# Patient Record
Sex: FEMALE | Race: WHITE | Employment: UNEMPLOYED | URBAN - METROPOLITAN AREA
[De-identification: names, ages, dates, MRNs, and addresses within clinical notes are randomized per-mention and may not be internally consistent; named-entity substitution may affect disease eponyms.]

---

## 2021-03-31 ENCOUNTER — HOSPITAL ENCOUNTER (INPATIENT)
Age: 41
LOS: 5 days | Discharge: HOME | DRG: 753 | End: 2021-04-05
Attending: EMERGENCY MEDICINE | Admitting: PSYCHIATRY & NEUROLOGY
Payer: MEDICAID

## 2021-03-31 ENCOUNTER — HOSPITAL ENCOUNTER (EMERGENCY)
Age: 41
Discharge: HOME | DRG: 753 | End: 2021-03-31
Attending: EMERGENCY MEDICINE
Payer: MEDICAID

## 2021-03-31 VITALS
WEIGHT: 130 LBS | OXYGEN SATURATION: 99 % | BODY MASS INDEX: 22.2 KG/M2 | HEIGHT: 64 IN | DIASTOLIC BLOOD PRESSURE: 66 MMHG | SYSTOLIC BLOOD PRESSURE: 110 MMHG | RESPIRATION RATE: 16 BRPM | TEMPERATURE: 98 F | HEART RATE: 105 BPM

## 2021-03-31 DIAGNOSIS — F11.20 HEROIN ADDICTION (HCC): Primary | ICD-10-CM

## 2021-03-31 DIAGNOSIS — R45.851 SUICIDAL IDEATION: ICD-10-CM

## 2021-03-31 PROBLEM — F15.10 AMPHETAMINE ABUSE, CONTINUOUS (HCC): Status: ACTIVE | Noted: 2021-03-31

## 2021-03-31 PROBLEM — F31.9 BIPOLAR 1 DISORDER, DEPRESSED (HCC): Status: ACTIVE | Noted: 2021-03-31

## 2021-03-31 PROBLEM — F11.93 OPIOID WITHDRAWAL (HCC): Status: ACTIVE | Noted: 2021-03-31

## 2021-03-31 PROBLEM — F32.A DEPRESSION WITH SUICIDAL IDEATION: Status: ACTIVE | Noted: 2021-03-31

## 2021-03-31 LAB
-: ABNORMAL
ABSOLUTE EOS #: 0.3 K/UL (ref 0–0.4)
ABSOLUTE IMMATURE GRANULOCYTE: ABNORMAL K/UL (ref 0–0.3)
ABSOLUTE LYMPH #: 3.3 K/UL (ref 1–4.8)
ABSOLUTE MONO #: 0.7 K/UL (ref 0.1–1.3)
ACETAMINOPHEN LEVEL: <5 UG/ML (ref 10–30)
ALBUMIN SERPL-MCNC: 3.9 G/DL (ref 3.5–5.2)
ALBUMIN/GLOBULIN RATIO: ABNORMAL (ref 1–2.5)
ALP BLD-CCNC: 89 U/L (ref 35–104)
ALT SERPL-CCNC: 10 U/L (ref 5–33)
AMORPHOUS: ABNORMAL
AMPHETAMINE SCREEN URINE: POSITIVE
ANION GAP SERPL CALCULATED.3IONS-SCNC: 8 MMOL/L (ref 9–17)
AST SERPL-CCNC: 18 U/L
BACTERIA: ABNORMAL
BARBITURATE SCREEN URINE: NEGATIVE
BASOPHILS # BLD: 1 % (ref 0–2)
BASOPHILS ABSOLUTE: 0 K/UL (ref 0–0.2)
BENZODIAZEPINE SCREEN, URINE: NEGATIVE
BILIRUB SERPL-MCNC: 0.26 MG/DL (ref 0.3–1.2)
BILIRUBIN URINE: NEGATIVE
BUN BLDV-MCNC: 14 MG/DL (ref 6–20)
BUN/CREAT BLD: ABNORMAL (ref 9–20)
BUPRENORPHINE URINE: ABNORMAL
CALCIUM SERPL-MCNC: 8.9 MG/DL (ref 8.6–10.4)
CANNABINOID SCREEN URINE: NEGATIVE
CASTS UA: ABNORMAL /LPF
CHLORIDE BLD-SCNC: 97 MMOL/L (ref 98–107)
CO2: 27 MMOL/L (ref 20–31)
COCAINE METABOLITE, URINE: NEGATIVE
COLOR: ABNORMAL
COMMENT UA: ABNORMAL
CREAT SERPL-MCNC: 0.76 MG/DL (ref 0.5–0.9)
CRYSTALS, UA: ABNORMAL /HPF
DIFFERENTIAL TYPE: ABNORMAL
EOSINOPHILS RELATIVE PERCENT: 3 % (ref 0–4)
EPITHELIAL CELLS UA: ABNORMAL /HPF
ETHANOL PERCENT: <0.01 %
ETHANOL: <10 MG/DL
GFR AFRICAN AMERICAN: >60 ML/MIN
GFR NON-AFRICAN AMERICAN: >60 ML/MIN
GFR SERPL CREATININE-BSD FRML MDRD: ABNORMAL ML/MIN/{1.73_M2}
GFR SERPL CREATININE-BSD FRML MDRD: ABNORMAL ML/MIN/{1.73_M2}
GLUCOSE BLD-MCNC: 98 MG/DL (ref 70–99)
GLUCOSE URINE: NEGATIVE
HCG QUALITATIVE: NEGATIVE
HCT VFR BLD CALC: 37.4 % (ref 36–46)
HEMOGLOBIN: 12.4 G/DL (ref 12–16)
IMMATURE GRANULOCYTES: ABNORMAL %
KETONES, URINE: NEGATIVE
LEUKOCYTE ESTERASE, URINE: NEGATIVE
LYMPHOCYTES # BLD: 41 % (ref 24–44)
MCH RBC QN AUTO: 27.2 PG (ref 26–34)
MCHC RBC AUTO-ENTMCNC: 33.2 G/DL (ref 31–37)
MCV RBC AUTO: 81.9 FL (ref 80–100)
MDMA URINE: ABNORMAL
METHADONE SCREEN, URINE: NEGATIVE
METHAMPHETAMINE, URINE: ABNORMAL
MONOCYTES # BLD: 9 % (ref 1–7)
MUCUS: ABNORMAL
NITRITE, URINE: NEGATIVE
NRBC AUTOMATED: ABNORMAL PER 100 WBC
OPIATES, URINE: POSITIVE
OTHER OBSERVATIONS UA: ABNORMAL
OXYCODONE SCREEN URINE: NEGATIVE
PDW BLD-RTO: 14.7 % (ref 11.5–14.9)
PH UA: 6 (ref 5–8)
PHENCYCLIDINE, URINE: NEGATIVE
PLATELET # BLD: 503 K/UL (ref 150–450)
PLATELET ESTIMATE: ABNORMAL
PMV BLD AUTO: 7.7 FL (ref 6–12)
POTASSIUM SERPL-SCNC: 3.7 MMOL/L (ref 3.7–5.3)
PROPOXYPHENE, URINE: ABNORMAL
PROTEIN UA: ABNORMAL
RBC # BLD: 4.57 M/UL (ref 4–5.2)
RBC # BLD: ABNORMAL 10*6/UL
RBC UA: ABNORMAL /HPF
RENAL EPITHELIAL, UA: ABNORMAL /HPF
SALICYLATE LEVEL: <1 MG/DL (ref 3–10)
SARS-COV-2, RAPID: NOT DETECTED
SEG NEUTROPHILS: 46 % (ref 36–66)
SEGMENTED NEUTROPHILS ABSOLUTE COUNT: 3.8 K/UL (ref 1.3–9.1)
SODIUM BLD-SCNC: 132 MMOL/L (ref 135–144)
SPECIFIC GRAVITY UA: 1.03 (ref 1–1.03)
SPECIMEN DESCRIPTION: NORMAL
TEST INFORMATION: ABNORMAL
TOTAL PROTEIN: 8.1 G/DL (ref 6.4–8.3)
TRICHOMONAS: ABNORMAL
TRICYCLIC ANTIDEPRESSANTS, UR: ABNORMAL
TURBIDITY: CLEAR
URINE HGB: NEGATIVE
UROBILINOGEN, URINE: NORMAL
WBC # BLD: 8.1 K/UL (ref 3.5–11)
WBC # BLD: ABNORMAL 10*3/UL
WBC UA: ABNORMAL /HPF
YEAST: ABNORMAL

## 2021-03-31 PROCEDURE — 84703 CHORIONIC GONADOTROPIN ASSAY: CPT

## 2021-03-31 PROCEDURE — G0480 DRUG TEST DEF 1-7 CLASSES: HCPCS

## 2021-03-31 PROCEDURE — 1240000000 HC EMOTIONAL WELLNESS R&B

## 2021-03-31 PROCEDURE — 80179 DRUG ASSAY SALICYLATE: CPT

## 2021-03-31 PROCEDURE — 81001 URINALYSIS AUTO W/SCOPE: CPT

## 2021-03-31 PROCEDURE — 85025 COMPLETE CBC W/AUTO DIFF WBC: CPT

## 2021-03-31 PROCEDURE — 6370000000 HC RX 637 (ALT 250 FOR IP): Performed by: PSYCHIATRY & NEUROLOGY

## 2021-03-31 PROCEDURE — 80307 DRUG TEST PRSMV CHEM ANLYZR: CPT

## 2021-03-31 PROCEDURE — 87635 SARS-COV-2 COVID-19 AMP PRB: CPT

## 2021-03-31 PROCEDURE — 99284 EMERGENCY DEPT VISIT MOD MDM: CPT

## 2021-03-31 PROCEDURE — 36415 COLL VENOUS BLD VENIPUNCTURE: CPT

## 2021-03-31 PROCEDURE — 80053 COMPREHEN METABOLIC PANEL: CPT

## 2021-03-31 PROCEDURE — 6370000000 HC RX 637 (ALT 250 FOR IP): Performed by: NURSE PRACTITIONER

## 2021-03-31 PROCEDURE — 99223 1ST HOSP IP/OBS HIGH 75: CPT | Performed by: PSYCHIATRY & NEUROLOGY

## 2021-03-31 PROCEDURE — 80143 DRUG ASSAY ACETAMINOPHEN: CPT

## 2021-03-31 RX ORDER — CYCLOBENZAPRINE HCL 10 MG
10 TABLET ORAL 3 TIMES DAILY PRN
Status: DISCONTINUED | OUTPATIENT
Start: 2021-03-31 | End: 2021-04-05 | Stop reason: HOSPADM

## 2021-03-31 RX ORDER — LOPERAMIDE HYDROCHLORIDE 2 MG/1
2 CAPSULE ORAL 4 TIMES DAILY PRN
Status: DISCONTINUED | OUTPATIENT
Start: 2021-03-31 | End: 2021-04-05 | Stop reason: HOSPADM

## 2021-03-31 RX ORDER — POLYETHYLENE GLYCOL 3350 17 G/17G
17 POWDER, FOR SOLUTION ORAL DAILY PRN
Status: DISCONTINUED | OUTPATIENT
Start: 2021-03-31 | End: 2021-04-05 | Stop reason: HOSPADM

## 2021-03-31 RX ORDER — CLONIDINE HYDROCHLORIDE 0.1 MG/1
0.1 TABLET ORAL 3 TIMES DAILY
Status: DISCONTINUED | OUTPATIENT
Start: 2021-03-31 | End: 2021-04-04

## 2021-03-31 RX ORDER — ACETAMINOPHEN 325 MG/1
650 TABLET ORAL EVERY 4 HOURS PRN
Status: DISCONTINUED | OUTPATIENT
Start: 2021-03-31 | End: 2021-04-05 | Stop reason: HOSPADM

## 2021-03-31 RX ORDER — MAGNESIUM HYDROXIDE/ALUMINUM HYDROXICE/SIMETHICONE 120; 1200; 1200 MG/30ML; MG/30ML; MG/30ML
30 SUSPENSION ORAL EVERY 6 HOURS PRN
Status: DISCONTINUED | OUTPATIENT
Start: 2021-03-31 | End: 2021-04-05 | Stop reason: HOSPADM

## 2021-03-31 RX ORDER — TRAZODONE HYDROCHLORIDE 50 MG/1
50 TABLET ORAL NIGHTLY PRN
Status: DISCONTINUED | OUTPATIENT
Start: 2021-03-31 | End: 2021-04-05 | Stop reason: HOSPADM

## 2021-03-31 RX ORDER — ONDANSETRON 4 MG/1
4 TABLET, FILM COATED ORAL EVERY 8 HOURS PRN
Status: DISCONTINUED | OUTPATIENT
Start: 2021-03-31 | End: 2021-04-05 | Stop reason: HOSPADM

## 2021-03-31 RX ORDER — IBUPROFEN 400 MG/1
400 TABLET ORAL EVERY 6 HOURS PRN
Status: DISCONTINUED | OUTPATIENT
Start: 2021-03-31 | End: 2021-04-05 | Stop reason: HOSPADM

## 2021-03-31 RX ORDER — HYDROXYZINE 50 MG/1
50 TABLET, FILM COATED ORAL 3 TIMES DAILY PRN
Status: DISCONTINUED | OUTPATIENT
Start: 2021-03-31 | End: 2021-04-05 | Stop reason: HOSPADM

## 2021-03-31 RX ORDER — DICYCLOMINE HYDROCHLORIDE 10 MG/1
10 CAPSULE ORAL 3 TIMES DAILY PRN
Status: DISCONTINUED | OUTPATIENT
Start: 2021-03-31 | End: 2021-04-05 | Stop reason: HOSPADM

## 2021-03-31 RX ADMIN — CYCLOBENZAPRINE 10 MG: 10 TABLET, FILM COATED ORAL at 19:30

## 2021-03-31 RX ADMIN — IBUPROFEN 400 MG: 400 TABLET, FILM COATED ORAL at 19:30

## 2021-03-31 RX ADMIN — HYDROXYZINE HYDROCHLORIDE 50 MG: 50 TABLET, FILM COATED ORAL at 19:30

## 2021-03-31 ASSESSMENT — ENCOUNTER SYMPTOMS
ABDOMINAL PAIN: 0
DIARRHEA: 0
COUGH: 0
DIARRHEA: 0
SHORTNESS OF BREATH: 0
COUGH: 0
SHORTNESS OF BREATH: 0
BACK PAIN: 0
VOMITING: 0
ABDOMINAL PAIN: 0
BACK PAIN: 0
VOMITING: 0

## 2021-03-31 ASSESSMENT — SLEEP AND FATIGUE QUESTIONNAIRES
DO YOU USE A SLEEP AID: COMMENT
RESTFUL SLEEP: NO
DO YOU HAVE DIFFICULTY SLEEPING: COMMENT
DIFFICULTY FALLING ASLEEP: YES
SLEEP PATTERN: UTA
SLEEP PATTERN: DIFFICULTY FALLING ASLEEP;DISTURBED/INTERRUPTED SLEEP

## 2021-03-31 ASSESSMENT — LIFESTYLE VARIABLES: HISTORY_ALCOHOL_USE: YES

## 2021-03-31 NOTE — PROGRESS NOTES
Pharmacy Medication History Note      List of current medications patient is taking is complete. Source of information: Patient, OARRS    Changes made to medication list:  Medications removed (include reason, ex. therapy complete or physician discontinued, noncompliance): All medications    Medications added/doses adjusted:  none    Other notes (ex. Recent course of antibiotics, Coumadin dosing):  Patient reports not taking medications on admission  Checked OARRS and nothing was reported      Please let me know if you have any questions about this encounter. Thank you!     Electronically signed by Clara Moreno on 3/31/2021 at 8:28 AM
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
## 2021-03-31 NOTE — ED TRIAGE NOTES
Patient's personal belongings secured and patient changed into blue gown by [employer] Safeguard informed of 1:1 watch and that they must be able to view patient's face at all time and may not leave at any time, under any circumstances. Safeguard instructed to call on radio or yell for help if patient attempts to leave or harm self/others. Mode of arrival (squad #, walk in, police, etc) : family      Chief complaint(s): suicidal detox       Arrival Note (brief scenario, treatment PTA, etc). : return and voiced feeling of taking to much heroin         C= \"Have you ever felt that you should Cut down on your drinking? \"  No  A= \"Have people Annoyed you by criticizing your drinking? \"  No  G= \"Have you ever felt bad or Guilty about your drinking? \"  No  E= \"Have you ever had a drink as an Eye-opener first thing in the morning to steady your nerves or to help a hangover? \"  No      Deferred []      Reason for deferring: N/A    *If yes to two or more: probable alcohol abuse. *

## 2021-03-31 NOTE — CARE COORDINATION
BHI Biopsychosocial Assessment    Current Level of Psychosocial Functioning     Independent  X  Dependent    Minimal Assist     Comments:      Psychosocial High Risk Factors (check all that apply)    Unable to obtain meds   Chronic illness/pain    Substance abuse  X  Lack of Family Support   Financial stress   Isolation   Inadequate Community Resources  Suicide attempt(s) X  Not taking medications   Victim of crime   Developmental Delay  Unable to manage personal needs    Age 72 or older   Homeless  No transportation   Readmission within 30 days  Unemployment  Traumatic Event    Psychiatric Advanced Directive:    Family to involve in treatment: Pt reports having good support from family and can live with grandmother    Sexual Orientation:  Heterosexual    Patient Strengths: Family support    Patient Barriers: Poor judgement and coping skills, substance abuse    Opiate education provided: Not at this time. Safety plan: Not completed at this time due to pt detoxing    CMHC/MH history: [facility]SI    Plan of Care:  medication management, group/individual therapies, family meetings, psycho -education, treatment team meetings to assist with stabilization    Initial Discharge Plan:  Can live with grandmother. Clinical Summary:  Pt is a 45year old  female admitted to the [facility] for safety and stabilization. Pt agreed to meet with writer outside of room. Pt answered questions during first part of assessment denying any SI, HI or hallucinations stating \"I just need to detox. \" Pt appeared to fall a sleep, head tucked into chest with arm supporting head going lax. Pt continued to answer questions to the best of her ability after writer would call pt's name.

## 2021-03-31 NOTE — PROGRESS NOTES
Behavioral Services  Medicare Certification Upon Admission    I certify that this patient's inpatient psychiatric hospital admission is medically necessary for:    [x] (1) Treatment which could reasonably be expected to improve this patient's condition,       [x] (2) Or for diagnostic study;     AND     [x](2) The inpatient psychiatric services are provided while the individual is under the care of a physician and are included in the individualized plan of care.     Estimated length of stay/service 4 to 7 days    Plan for post-hospital care Home with outpatient community health follow-up versus inpatient rehab    Electronically signed by Silvino Fregoso MD on 3/31/2021 at 4:10 PM

## 2021-03-31 NOTE — Clinical Note
Patient Class: Inpatient [101]   REQUIRED: Diagnosis: Depression with suicidal ideation [614968]   Estimated Length of Stay: Estimated stay of more than 2 midnights   Admitting Provider: Josi Roman [[id number]]   Preferred Department: [facility]

## 2021-03-31 NOTE — GROUP NOTE
Group Therapy Note    Date: 3/31/2021    Group Start Time: 1320  Group End Time: 3111  Group Topic: Cognitive Skills    SLOAN Burch        Group Therapy Note    Attendees: 3/13      patient refused to attend creative expression group at [phone] after encouragement from staff. 1:1 talk time provided as alternative to group session.        Signature:  Arcelia Mccabe, [address]

## 2021-03-31 NOTE — BH NOTE
Physician Communication    [phone]: On call internal medicine physician notified of consult via [employer]. [phone]: Physician acknowledged consult and will see patient on 4/1/2021 per message on [employer].
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
## 2021-03-31 NOTE — BH NOTE
Patient given tobacco quitline number [phone] at this time, refusing to call at this time, states \" I just dont want to quit now\"- patient given information as to the dangers of long term tobacco use. Continue to reinforce the importance of tobacco cessation.

## 2021-03-31 NOTE — ED PROVIDER NOTES
EMERGENCY DEPARTMENT ENCOUNTER    Pt Name: Laya Greene  MRN: [id number]  [place] 1980  Date of evaluation: 3/31/21  CHIEF COMPLAINT       Chief Complaint   Patient presents with    Mental Health Problem     HISTORY OF PRESENT ILLNESS   HPI. This is a 43-year-old female with a history of opioid dependence who comes in. I just evaluated this patient she was denying suicidal and homicidal ideation. She was discharged. She presents complaining of suicidal ideation with an intent to overdose on heroin. She has no new complaints at this time. REVIEW OF SYSTEMS     Review of Systems   Constitutional: Negative for fever. HENT: Negative for congestion. Respiratory: Negative for cough and shortness of breath. Cardiovascular: Negative for chest pain. Gastrointestinal: Negative for abdominal pain, diarrhea and vomiting. Genitourinary: Negative for dysuria. Musculoskeletal: Negative for back pain. Skin: Negative for rash. Neurological: Negative for headaches. Psychiatric/Behavioral: Positive for suicidal ideas. All other systems reviewed and are negative. PASTMEDICAL HISTORY     Past Medical History:   Diagnosis Date    B[facility] disorder ([facility] [address]     Depression      SURGICAL HISTORY       Past Surgical History:   Procedure Laterality Date    TUBAL LIGATION       CURRENT MEDICATIONS       Previous Medications    BUPRENORPHINE HCL-NALOXONE HCL (SUBOXONE) 4-1 MG FILM    Place 4 mg under the tongue 4 times daily. BUSPIRONE (BUSPAR) 10 MG TABLET    Take 20 mg by mouth 2 times daily    CITALOPRAM (CELEXA) 20 MG TABLET    Take 1 tablet by mouth daily for 30 days. FLUOXETINE (PROZAC) 20 MG CAPSULE    Take 20 mg by mouth three times daily    IBUPROFEN (ADVIL;MOTRIN) 800 MG TABLET    Take 1 tablet by mouth every 8 hours as needed for Pain    LITHIUM (ESKALITH) 450 MG CR TABLET    Take 1 tablet by mouth 2 times daily for 30 days.     OLANZAPINE (ZYPREXA) 10 MG TABLET    Take 10 mg by mouth 2 by the radiologist, see reports below, unless otherwisenoted in MDM or here. No orders to display     LABS: All lab results were reviewed by myself, and all abnormals are listed below. Labs Reviewed   COVID-19, RAPID   CBC WITH AUTO DIFFERENTIAL   COMPREHENSIVE METABOLIC PANEL   HCG, SERUM, QUALITATIVE   ETHANOL   ACETAMINOPHEN LEVEL   SALICYLATE LEVEL       EMERGENCY DEPARTMENTCOURSE:   Differential diagnosis includes exacerbation of chronic mental illness malingering polysubstance abuse. Patient has no medical complaints at this time she is medically clear patient now suicidal will admit to the [facility] for further management of her mental health disorder and substance abuse. Vitals:    Vitals:    03/31/21 0408   Resp: 16   Temp: 98 °F (36.7 °C)   TempSrc: Oral   Weight: 130 lb (59 kg)   Height: 5' 4\" (1.626 m)         FINAL IMPRESSION      1. Heroin addiction ([facility] [address]    2.  Suicidal ideation         DISPOSITION/PLAN   DISPOSITION Decision To Admit 03/31/2021 04:41:32 AM    Lorene High MD  Attending Emergency Physician    This charting supersedes any ED resident or staff charting and was written using speech recognition software        Lorene High MD  03/31/21 1800 Johnnie Hyman MD  03/31/21 4929

## 2021-03-31 NOTE — BH NOTE
`[facility] [place]  Admission Note     Admission Type:   Admission Type: Voluntary    Reason for admission:  Reason for Admission: Increased depression related to addiction and an increase in life stressors.     PATIENT STRENGTHS:  Strengths: Positive Support, Social Skills    Patient Strengths and Limitations:  Limitations: General negative or hopeless attitude about future/recovery, External locus of control    Addictive Behavior:   Addictive Behavior  In the past 3 months, have you felt or has someone told you that you have a problem with:  : None  Do you have a history of Chemical Use?: Yes  Do you have a history of Alcohol Use?: Yes  Do you have a history of Street Drug Abuse?: Yes  Histroy of Prescripton Drug Abuse?: No    Medical Problems:   Past Medical History:   Diagnosis Date    Bipolar disorder ([facility] [address]     Depression        Status EXAM:  Status and Exam  Normal: No  Facial Expression: Flat  Affect: Appropriate  Level of Consciousness: Alert  Mood:Normal: No  Mood: Anxious, Depressed  Motor Activity:Normal: No  Motor Activity: Decreased  Interview Behavior: Cooperative  Preception: Tracy to Person, Jillene Pry to Time, Tracy to Place, Tracy to Situation  Attention:Normal: Yes  Thought Content:Normal: No  Thought Content: Poverty of Content  Hallucinations: None  Delusions: No  Memory:Normal: Yes  Insight and Judgment: No  Insight and Judgment: Poor Judgment, Poor Insight  Present Suicidal Ideation: No  Present Homicidal Ideation: No    Tobacco Screening:  Practical Counseling, on admission, carlos X, if applicable and completed (first 3 are required if patient doesn't refuse):            ( )  Recognizing danger situations (included triggers and roadblocks)                    ( )  Coping skills (new ways to manage stress, exercise, relaxation techniques, changing routine, distraction)                                                           ( )  Basic information about quitting (benefits of quitting, techniques in how to quit, available resources  ( ) Referral for counseling faxed to [facility]                                           (x ) Patient refused counseling  ( ) Patient has not smoked in the last 30 days    Metabolic Screening:    No results found for: LABA1C    No results found for: CHOL  No results found for: TRIG  No results found for: HDL  No components found for: LDLCAL  No results found for: LABVLDL      Body mass index is 22.31 kg/m². BP Readings from Last 2 Encounters:   03/31/21 (!) 102/57   03/31/21 110/66           Pt admitted with followings belongings:  Dentures: None  Vision - Corrective Lenses: None  Hearing Aid: None  Jewelry: Ring, Necklace, Bracelet  Body Piercings Removed: N/A  Clothing: Footwear, Pants, Jacket / coat, Shirt  Were All Patient Medications Collected?: Not Applicable  Other Valuables: None     Valuables sent home with patient. Valuables placed in safe in security envelope, number:  [id number]. Patient's home medications were verified. Patient oriented to surroundings and program expectations and copy of patient rights given. Received admission packet:  yes. Consents reviewed, signed yes. Refused no. Patient verbalize understanding:  yes. Patient education on precautions: completed                 Patient brought to the unit by [facility] staff from the ED. Patient recently came [facility] from [facility]. Patient admitted to suicidal ideation in the ED, but denied on admission. Patient reported recent heroin use with last use on 3/30/2021. Patient was withdrawn during interview and gave minimal answers. Patient verbalized depression and anxiety. Patient was wanded and oriented to the unit.   Samuel Dobson RN

## 2021-03-31 NOTE — PLAN OF CARE
5 [facility]  Initial Interdisciplinary Treatment Plan NO      Original treatment plan Date & Time:3/31/21    Admission Type:       Reason for admission:        Estimated Length of Stay:  5-7days  Estimated Discharge Date: to be determined by physician    PATIENT STRENGTHS:  Patient Strengths:Strengths: Positive Support, Social Skills  Patient Strengths and Limitations:Limitations: General negative or hopeless attitude about future/recovery, External locus of control  Addictive Behavior: Addictive Behavior  In the past 3 months, have you felt or has someone told you that you have a problem with:  : None  Do you have a history of Chemical Use?: Yes  Do you have a history of Alcohol Use?: Yes  Do you have a history of Street Drug Abuse?: Yes  Histroy of Prescripton Drug Abuse?: No  Medical Problems:  Past Medical History:   Diagnosis Date    Bipolar disorder ([facility] [place] [facility]     Depression      Status EXAM:Status and Exam  Normal: No  Facial Expression: Flat  Affect: Appropriate  Level of Consciousness: Alert  Mood:Normal: No  Mood: Anxious, Depressed  Motor Activity:Normal: No  Motor Activity: Decreased  Interview Behavior: Cooperative  Preception: [place] to Person, Ronald Katayama to Time, [place] to Place, [place] to Situation  Attention:Normal: Yes  Thought Content:Normal: No  Thought Content: Poverty of Content  Hallucinations: None  Delusions: No  Memory:Normal: Yes  Insight and Judgment: No  Insight and Judgment: Poor Judgment, Poor Insight  Present Suicidal Ideation: No  Present Homicidal Ideation: No    EDUCATION:   Learner Progress Toward Treatment Goals: reviewed group plans and strategies for care    Method:group therapy, medication compliance, individualized assessments and care planning    Outcome: needs reinforcement    PATIENT GOALS: to be discussed with patient within 72 hours    PLAN/TREATMENT RECOMMENDATIONS:     continue group therapy , medications compliance, goal setting, individualized assessments and care, continue to monitor pt on unit      SHORT-TERM GOALS:   Time frame for Short-Term Goals: 5-7 days    LONG-TERM GOALS:  Time frame for Long-Term Goals: 6 months  Members Present in Team Meeting: See Signature [address] [place]

## 2021-03-31 NOTE — GROUP NOTE
Group Therapy Note    Date: 3/31/2021    Group Start Time: 0900  Group End Time: 0920  Group Topic: Community Meeting    [facility]    Patient refused to attend community meeting and goal setting group at 0900 after encouragement from staff. 1:1 talk time offered by staff as alternative to group session.

## 2021-03-31 NOTE — ED NOTES
Brad Tejeda is a 45year old female who presents to the ED via pt's family for care of detox. Pt denies SI/HI/AH. Pt is looking for heroin detox services. Pt intravenously abuses a 1/2 gram of heroin on daily. Pt's last use was around noon yesterday. Pt admits to taking two Suboxone that pt got from a friend before coming to the ED. Pt also reports pt occasionally smokes meth and last did 2 days ago. Pt just moved back to [place] from [address] [place] [address] 2 days ago in order to try and regain custody of her 3 children. Pt's children are the ages of 13, 15, and 6. Pt signed custody of her children over to their father 6 years ago and their father has recently passed away. Pt is wanting detox services in order to better herself for her children. Pt's family is being supportive of pt and is helping pt work towards her sobriety. Pt reports pt has been previously diagnosed with bipolar disorder, anxiety and borderline personality disorder. Pt has been off medications for her mental health for the past 6 years. Pt has no previous admission to the [facility]. Pt continues to deny SI/HI/AH. RASHAWN discussed inpatient/outpatient detox services in the [facility] with pt. Pt reports pt feels safe to be discharged and plans to follow up with [facility] ambulatory detox in the morning. RASHAWN provided pt with a book of substance abuse resources and [facility] information. RASHAWN encouraged pt to follow up with [facility] for care of pt's mental health as well as a detox facility. RASHAWN consulted with ED Dr. ED Dr and RASHAWN agree pt is safe to be discharged home. Pt is not a risk to self or others at this time. Pt denies SI/HI. Pt agreeable to be discharged home and follow up outpatient services.

## 2021-04-01 PROBLEM — R82.90 ABNORMAL URINALYSIS: Status: ACTIVE | Noted: 2021-04-01

## 2021-04-01 PROBLEM — E87.1 HYPONATREMIA: Status: ACTIVE | Noted: 2021-04-01

## 2021-04-01 PROBLEM — E87.6 HYPOKALEMIA: Status: ACTIVE | Noted: 2021-04-01

## 2021-04-01 LAB
ANION GAP SERPL CALCULATED.3IONS-SCNC: 11 MMOL/L (ref 9–17)
BUN BLDV-MCNC: 19 MG/DL (ref 6–20)
BUN/CREAT BLD: NORMAL (ref 9–20)
CALCIUM SERPL-MCNC: 9 MG/DL (ref 8.6–10.4)
CHLORIDE BLD-SCNC: 101 MMOL/L (ref 98–107)
CO2: 24 MMOL/L (ref 20–31)
CREAT SERPL-MCNC: 0.67 MG/DL (ref 0.5–0.9)
GFR AFRICAN AMERICAN: >60 ML/MIN
GFR NON-AFRICAN AMERICAN: >60 ML/MIN
GFR SERPL CREATININE-BSD FRML MDRD: NORMAL ML/MIN/{1.73_M2}
GFR SERPL CREATININE-BSD FRML MDRD: NORMAL ML/MIN/{1.73_M2}
GLUCOSE BLD-MCNC: 93 MG/DL (ref 70–99)
HIV AG/AB: NONREACTIVE
POTASSIUM SERPL-SCNC: 3.8 MMOL/L (ref 3.7–5.3)
SODIUM BLD-SCNC: 136 MMOL/L (ref 135–144)
T. PALLIDUM, IGG: NONREACTIVE
TSH SERPL DL<=0.05 MIU/L-ACNC: 0.32 MIU/L (ref 0.3–5)

## 2021-04-01 PROCEDURE — 6370000000 HC RX 637 (ALT 250 FOR IP): Performed by: NURSE PRACTITIONER

## 2021-04-01 PROCEDURE — 87389 HIV-1 AG W/HIV-1&-2 AB AG IA: CPT

## 2021-04-01 PROCEDURE — 1240000000 HC EMOTIONAL WELLNESS R&B

## 2021-04-01 PROCEDURE — 6370000000 HC RX 637 (ALT 250 FOR IP): Performed by: PSYCHIATRY & NEUROLOGY

## 2021-04-01 PROCEDURE — 36415 COLL VENOUS BLD VENIPUNCTURE: CPT

## 2021-04-01 PROCEDURE — 84443 ASSAY THYROID STIM HORMONE: CPT

## 2021-04-01 PROCEDURE — 99232 SBSQ HOSP IP/OBS MODERATE 35: CPT | Performed by: PSYCHIATRY & NEUROLOGY

## 2021-04-01 PROCEDURE — 99253 IP/OBS CNSLTJ NEW/EST LOW 45: CPT | Performed by: INTERNAL MEDICINE

## 2021-04-01 PROCEDURE — 80048 BASIC METABOLIC PNL TOTAL CA: CPT

## 2021-04-01 PROCEDURE — 86780 TREPONEMA PALLIDUM: CPT

## 2021-04-01 RX ADMIN — IBUPROFEN 400 MG: 400 TABLET, FILM COATED ORAL at 06:52

## 2021-04-01 RX ADMIN — ACETAMINOPHEN 650 MG: 325 TABLET, FILM COATED ORAL at 21:06

## 2021-04-01 RX ADMIN — CYCLOBENZAPRINE 10 MG: 10 TABLET, FILM COATED ORAL at 06:52

## 2021-04-01 RX ADMIN — IBUPROFEN 400 MG: 400 TABLET, FILM COATED ORAL at 15:41

## 2021-04-01 RX ADMIN — HYDROXYZINE HYDROCHLORIDE 50 MG: 50 TABLET, FILM COATED ORAL at 15:40

## 2021-04-01 RX ADMIN — TRAZODONE HYDROCHLORIDE 50 MG: 50 TABLET ORAL at 21:07

## 2021-04-01 RX ADMIN — DICYCLOMINE HYDROCHLORIDE 10 MG: 10 CAPSULE ORAL at 10:06

## 2021-04-01 RX ADMIN — HYDROXYZINE HYDROCHLORIDE 50 MG: 50 TABLET, FILM COATED ORAL at 06:52

## 2021-04-01 RX ADMIN — ACETAMINOPHEN 650 MG: 325 TABLET, FILM COATED ORAL at 10:05

## 2021-04-01 RX ADMIN — CYCLOBENZAPRINE 10 MG: 10 TABLET, FILM COATED ORAL at 15:40

## 2021-04-01 ASSESSMENT — PAIN SCALES - GENERAL
PAINLEVEL_OUTOF10: 2
PAINLEVEL_OUTOF10: 1
PAINLEVEL_OUTOF10: 7
PAINLEVEL_OUTOF10: 2
PAINLEVEL_OUTOF10: 2

## 2021-04-01 NOTE — GROUP NOTE
Group Therapy Note    Date: 4/1/2021    Group Start Time: 1430  Group End Time: 1500  Group Topic: Healthy Living/Wellness    [facility]    Patient refused to attend health and fitness group at 1430 after encouragement from staff. 1:1 talk time offered by staff as alternative to group session.

## 2021-04-01 NOTE — PLAN OF CARE
5 [facility]  Day 3 Interdisciplinary Treatment Plan NOTE    Review Date & Time: 4/1/2021 1258    Admission Type:   Admission Type: Voluntary    Reason for admission:  Reason for Admission: Increased depression related to addiction and an increase in life stressors.   Estimated Length of Stay:  5-7 days  Estimated Discharge Date Update:  to be determined by physician    PATIENT STRENGTHS:  Patient Strengths:Strengths: Positive Support, No significant Physical Illness  Patient Strengths and Limitations:Limitations: Tendency to isolate self, External locus of control, Inappropriate/potentially harmful leisure interests, Lacks leisure interests, Multiple barriers to leisure interests  Addictive Behavior:Addictive Behavior  In the past 3 months, have you felt or has someone told you that you have a problem with:  : None  Do you have a history of Chemical Use?: No  Do you have a history of Alcohol Use?: No  Do you have a history of Street Drug Abuse?: Yes  Histroy of Prescripton Drug Abuse?: No  Medical Problems:  Past Medical History:   Diagnosis Date    Bipolar disorder ([facility]     Depression        Risk:  Fall RiskTotal: 71  Ricki Scale Ricki Scale Score: 23  BVC Total: 0  Change in scores:  No Changes to plan of Care:  No    Status EXAM:   Status and Exam  Normal: No  Facial Expression: Avoids Gaze, Flat  Affect: Incongruent  Level of Consciousness: Alert  Mood:Normal: No  Mood: Depressed, Sad  Motor Activity:Normal: No  Motor Activity: Decreased  Interview Behavior: Cooperative  Preception: [place] to Person, [place] to Time, [place] to Place, [place] to Situation  Attention:Normal: No  Attention: Unable to Concentrate  Thought Processes: Blocking  Thought Content:Normal: No  Thought Content: Poverty of Content  Hallucinations: None  Delusions: No  Memory:Normal: Yes  Insight and Judgment: Yes  Insight and Judgment: Poor Insight, Poor Judgment  Present Suicidal Ideation: No  Present Homicidal Ideation: No    Daily Assessment Last Entry:             Patient Currently in Pain: Denies       Patient Monitoring:  Frequency of Checks: 4 times per hour, close    Psychiatric Symptoms:                    Suicide Risk CSSR-S:  1) Within the past month, have you wished you were dead or wished you could go to sleep and not wake up? : No  2) Have you actually had any thoughts of killing yourself? : No  3) Have you been thinking about how you might kill yourself? : No  5) Have you started to work out or worked out the details of how to kill yourself?  Do you intend to carry out this plan? : No  6) Have you ever done anything, started to do anything, or prepared to do anything to end your life?: No  Change in Result:  Change in Plan of care:       EDUCATION:   Learner Progress Toward Treatment Goals: Reviewed results and recommendations of this team, Reviewed group plan and strategies, Reviewed signs, symptoms and risk of self harm and violent behavior, Reviewed goals and plan of care    Method:  small group, individual verbal education    Outcome:  Verbalized by patient but needs reinforcement to obtain goals    PATIENT GOALS:  Short term:  Pt unable to participate  Long term:  Pt unable to participated    PLAN/TREATMENT RECOMMENDATIONS UPDATE:  continue with group therapies, increased socialization, continue planning for after discharge goals, continue with medication compliance    SHORT-TERM GOALS UPDATE:   Time frame for Short-Term Goals:  5-7 days    LONG-TERM GOALS UPDATE:   Time frame for Long-Term Goals:  6 months    Members Present in Team Meeting:   See signature sheet  Jeanne Gina, CTRS

## 2021-04-01 NOTE — CONSULTS
negative for fevers, chills, sweats, fatigue, weight loss  HEENT:  negative for vision, hearing changes, runny nose, throat pain  RESPIRATORY:  negative for shortness of breath, cough, congestion, wheezing. CARDIOVASCULAR:  negative for chest pain, palpitations. GASTROINTESTINAL: Abdominal cramps, loose stools  GENITOURINARY:  negative for difficulty of urination, burning with urination, frequency   INTEGUMENT:  negative for rash, skin lesions, easy bruising   HEMATOLOGIC/LYMPHATIC:  negative for swelling/edema   ALLERGIC/IMMUNOLOGIC:  negative for urticaria , itching  ENDOCRINE:  negative increase in drinking, increase in urination, hot or cold intolerance  MUSCULOSKELETAL:  negative joint pains, muscle aches, swelling of joints  NEUROLOGICAL:  negative for headaches, dizziness, lightheadedness, numbness, pain, tingling extremities  BEHAVIOR/PSYCH:      Physical Exam:     BP (!) 93/50   Pulse 92   Temp 98 °F (36.7 °C) (Oral)   Resp 14   Ht 5' 4\" (1.626 m)   Wt 130 lb (59 kg)   LMP 2010 (Within Weeks)   SpO2 100%   BMI 22.31 kg/m²   Temp (24hrs), Av °F (36.7 °C), Min:98 °F (36.7 °C), Max:98 °F (36.7 °C)    No results for input(s): POCGLU in the last 72 hours. No intake or output data in the 24 hours ending 21 1608    General Appearance:  alert, well appearing, and in no acute distress  Mental status: oriented to person, place, and time with normal affect  Head:  normocephalic, atraumatic. Eye: no icterus, redness, pupils equal and reactive, extraocular eye movements intact, conjunctiva clear  Ear: normal external ear, no discharge, hearing intact  Nose:  no drainage noted  Mouth: mucous membranes moist  Neck: supple, no carotid bruits, thyroid not palpable  Lungs: Bilateral equal air entry, clear to ausculation, no wheezing, rales or rhonchi, normal effort  Cardiovascular: normal rate, regular rhythm, no murmur, gallop, rub.   Abdomen: Soft, nontender, nondistended, normal bowel sounds, no Urinalysis Comments NOT REPORTED    Microscopic Urinalysis    Collection Time: 03/31/21  7:38 PM   Result Value Ref Range    -          WBC, UA 2 TO 5 /HPF    RBC, UA 2 TO 5 /HPF    Casts UA NOT REPORTED /LPF    Crystals, UA NOT REPORTED None /HPF    Epithelial Cells UA 5 TO 10 /HPF    Renal Epithelial, UA NOT REPORTED 0 /HPF    Bacteria, UA MANY (A) None    Mucus, UA 3+ (A) None    Trichomonas, UA NOT REPORTED None    Amorphous, UA 1+ (A) None    Other Observations UA NOT REPORTED NOT REQ. Yeast, UA NOT REPORTED None   TSH w/reflex to FT4    Collection Time: 04/01/21  7:15 AM   Result Value Ref Range    TSH 0.32 0.30 - 5.00 mIU/L   HIV Screen    Collection Time: 04/01/21  7:15 AM   Result Value Ref Range    HIV Ag/Ab NONREACTIVE NONREACTIVE   T.pallidum Ab Screen    Collection Time: 04/01/21  7:15 AM   Result Value Ref Range    T. pallidum, IgG NONREACTIVE NONREACTIVE   BASIC METABOLIC PANEL    Collection Time: 04/01/21  2:06 PM   Result Value Ref Range    Glucose 93 70 - 99 mg/dL    BUN 19 6 - 20 mg/dL    CREATININE 0.67 0.50 - 0.90 mg/dL    Bun/Cre Ratio NOT REPORTED 9 - 20    Calcium 9.0 8.6 - 10.4 mg/dL    Sodium 136 135 - 144 mmol/L    Potassium 3.8 3.7 - 5.3 mmol/L    Chloride 101 98 - 107 mmol/L    CO2 24 20 - 31 mmol/L    Anion Gap 11 9 - 17 mmol/L    GFR Non-African American >60 >60 mL/min    GFR African American >60 >60 mL/min    GFR Comment          GFR Staging NOT REPORTED            Consultations:   IP CONSULT TO INTERNAL MEDICINE  Assessment :      Primary Problem  Bipolar 1 disorder, depressed ([facility]    Active Hospital Problems    Diagnosis Date Noted    Depression with suicidal ideation [F32.9, R45.851] 03/31/2021    Bipolar 1 disorder, depressed ([facility] [F31.9] 03/31/2021    Opioid withdrawal (HCC) [F11.23] 03/31/2021    Amphetamine abuse, continuous ([facility] [F15.10] 03/31/2021       Plan:     1.  Hyponatremia, hypokalemia, possibility of dehydration, repeat testing, potassium and sodium is in normal range  2. Abnormal UA, denying complaint of dysuria, nitrites, leukocyte esterase is negative, 5-10 epithelial cells in UA possible contaminated sample  3. Slightly elevated platelets, no further work-up is required  4. Jae Nicholson MD  4/1/2021  4:08 PM    Copy sent to Dr. Vasquez primary care provider on file. Please note that this chart was generated using voice recognition Dragon dictation software. Although every effort was made to ensure the accuracy of this automated transcription, some errors in transcription may have occurred.

## 2021-04-01 NOTE — PROGRESS NOTES
continued suicidal ideation, able to contract for safety on unit  Delusions:  no evidence of delusions  Perceptual Disturbance:  denies any perceptual disturbance  Cognition:  Oriented to self, location, time, and situation  Memory: age appropriate  Insight & Judgement: improving  Medication side effects:  denies       Data   height is 5' 4\" (1.626 m) and weight is 130 lb (59 kg). Her oral temperature is 98 °F (36.7 °C). Her blood pressure is 96/50 (abnormal) and her pulse is 92. Her respiration is 14 and oxygen saturation is 100%.    Labs:   Admission on 03/31/2021   Component Date Value Ref Range Status    WBC 03/31/2021 8.1  3.5 - 11.0 k/uL Final    RBC 03/31/2021 4.57  4.0 - 5.2 m/uL Final    Hemoglobin 03/31/2021 12.4  12.0 - 16.0 g/dL Final    Hematocrit 03/31/2021 37.4  36 - 46 % Final    MCV 03/31/2021 81.9  80 - 100 fL Final    MCH 03/31/2021 27.2  26 - 34 pg Final    MCHC 03/31/2021 33.2  31 - 37 g/dL Final    RDW 03/31/2021 14.7  11.5 - 14.9 % Final    Platelets 88/93/1193 503* 150 - 450 k/uL Final    MPV 03/31/2021 7.7  6.0 - 12.0 fL Final    NRBC Automated 03/31/2021 NOT REPORTED  per 100 WBC Final    Differential Type 03/31/2021 NOT REPORTED   Final    Seg Neutrophils 03/31/2021 46  36 - 66 % Final    Lymphocytes 03/31/2021 41  24 - 44 % Final    Monocytes 03/31/2021 9* 1 - 7 % Final    Eosinophils % 03/31/2021 3  0 - 4 % Final    Basophils 03/31/2021 1  0 - 2 % Final    Immature Granulocytes 03/31/2021 NOT REPORTED  0 % Final    Segs Absolute 03/31/2021 3.80  1.3 - 9.1 k/uL Final    Absolute Lymph # 03/31/2021 3.30  1.0 - 4.8 k/uL Final    Absolute Mono # 03/31/2021 0.70  0.1 - 1.3 k/uL Final    Absolute Eos # 03/31/2021 0.30  0.0 - 0.4 k/uL Final    Basophils Absolute 03/31/2021 0.00  0.0 - 0.2 k/uL Final    Absolute Immature Granulocyte 03/31/2021 NOT REPORTED  0.00 - 0.30 k/uL Final    WBC Morphology 03/31/2021 NOT REPORTED   Final    RBC Morphology 03/31/2021 NOT REPORTED Final    Platelet Estimate 76/32/1747 NOT REPORTED   Final    Glucose 03/31/2021 98  70 - 99 mg/dL Final    BUN 03/31/2021 14  6 - 20 mg/dL Final    CREATININE 03/31/2021 0.76  0.50 - 0.90 mg/dL Final    Bun/Cre Ratio 03/31/2021 NOT REPORTED  9 - 20 Final    Calcium 03/31/2021 8.9  8.6 - 10.4 mg/dL Final    Sodium 03/31/2021 132* 135 - 144 mmol/L Final    Potassium 03/31/2021 3.7  3.7 - 5.3 mmol/L Final    Chloride 03/31/2021 97* 98 - 107 mmol/L Final    CO2 03/31/2021 27  20 - 31 mmol/L Final    Anion Gap 03/31/2021 8* 9 - 17 mmol/L Final    Alkaline Phosphatase 03/31/2021 89  35 - 104 U/L Final    ALT 03/31/2021 10  5 - 33 U/L Final    AST 03/31/2021 18  <32 U/L Final    Total Bilirubin 03/31/2021 0.26* 0.3 - 1.2 mg/dL Final    Total Protein 03/31/2021 8.1  6.4 - 8.3 g/dL Final    Albumin 03/31/2021 3.9  3.5 - 5.2 g/dL Final    Albumin/Globulin Ratio 03/31/2021 NOT REPORTED  1.0 - 2.5 Final    GFR Non- 03/31/2021 >60  >60 mL/min Final    GFR  03/31/2021 >60  >60 mL/min Final    GFR Comment 03/31/2021        Final    Comment: Average GFR for 30-36 years old:   80 mL/min/1.73sq m  Chronic Kidney Disease:   <60 mL/min/1.73sq m  Kidney failure:   <15 mL/min/1.73sq m              eGFR calculated using average adult body mass. Additional eGFR calculator available at:        [employer]            GFR Staging 03/31/2021 NOT REPORTED   Final    hCG Qual 03/31/2021 NEGATIVE  NEGATIVE Final    Comment: Specimens with hCG levels near the threshold of the test (25 mIU/mL) may give a negative or   indeterminate result. In such cases, another test should be performed with a new specimen   in 48-72 hours. If early pregnancy is suspected clinically in this setting, correlation   with quantitative serum b-hCG level is suggested.       Ethanol 03/31/2021 <10  <10 mg/dL Final    Ethanol percent 03/31/2021 <0.010  % Final    Acetaminophen Level 03/31/2021 <5* 10 - 30 ug/mL Final    Salicylate Lvl 24/68/0381 <1* 3 - 10 mg/dL Final    Specimen Description 03/31/2021 . NASOPHARYNGEAL SWAB   Final    SARS-CoV-2, Rapid 03/31/2021 Not Detected  Not Detected Final    Comment:       Rapid NAAT:  The specimen is NEGATIVE for SARS-CoV-2, the novel coronavirus associated with   COVID-19. The ID NOW COVID-19 assay is designed to detect the virus that causes COVID-19 in patients   with signs and symptoms of infection who are suspected of COVID-19. An individual without symptoms of COVID-19 and who is not shedding SARS-CoV-2 virus would   expect to have a negative (not detected) result in this assay. Negative results should be treated as presumptive and, if inconsistent with clinical signs   and symptoms or necessary for patient management,  should be tested with an alternative molecular assay. Negative results do not preclude   SARS-CoV-2 infection and   should not be used as the sole basis for patient management decisions.          Fact sheet for Healthcare Providers: Deborah  Fact sheet for Patients: Deborah          Methodology: Isothermal Nucleic Acid Amplification      Amphetamine Screen, Ur 03/31/2021 POSITIVE* NEGATIVE Final    Comment:       (Positive cutoff 1000 ng/mL)                  Barbiturate Screen, Ur 03/31/2021 NEGATIVE  NEGATIVE Final    Comment:       (Positive cutoff 200 ng/mL)                  Benzodiazepine Screen, Urine 03/31/2021 NEGATIVE  NEGATIVE Final    Comment:       (Positive cutoff 200 ng/mL)                  Cocaine Metabolite, Urine 03/31/2021 NEGATIVE  NEGATIVE Final    Comment:       (Positive cutoff 300 ng/mL)                  Methadone Screen, Urine 03/31/2021 NEGATIVE  NEGATIVE Final    Comment:       (Positive cutoff 300 ng/mL)                  Opiates, Urine 03/31/2021 POSITIVE* NEGATIVE Final    Comment:       (Positive cutoff 300 ng/mL)  Phencyclidine, Urine 03/31/2021 NEGATIVE  NEGATIVE Final    Comment:       (Positive cutoff 25 ng/mL)                  Propoxyphene, Urine 03/31/2021 NOT REPORTED  NEGATIVE Final    Cannabinoid Scrn, Ur 03/31/2021 NEGATIVE  NEGATIVE Final    Comment:       (Positive cutoff 50 ng/mL)                  Oxycodone Screen, Ur 03/31/2021 NEGATIVE  NEGATIVE Final    Comment:       (Positive cutoff 100 ng/mL)                  Methamphetamine, Urine 03/31/2021 NOT REPORTED  NEGATIVE Final    Tricyclic Antidepressants, Urine 03/31/2021 NOT REPORTED  NEGATIVE Final    MDMA, Urine 03/31/2021 NOT REPORTED  NEGATIVE Final    Buprenorphine Urine 03/31/2021 NOT REPORTED  NEGATIVE Final    Test Information 03/31/2021 Assay provides medical screening only. The absence of expected drug(s) and/or metabolite(s) may indicate diluted or adulterated urine, limitations of testing or timing of collection. Final    Comment: Testing for legal purposes should be confirmed by another method. To request confirmation   of test result, please call the lab within 7 days of sample submission.       Color, UA 03/31/2021 DARK YELLOW* YELLOW Final    Turbidity UA 03/31/2021 CLEAR  CLEAR Final    Glucose, Ur 03/31/2021 NEGATIVE  NEGATIVE Final    Bilirubin Urine 03/31/2021 NEGATIVE  NEGATIVE Final    Ketones, Urine 03/31/2021 NEGATIVE  NEGATIVE Final    Specific [place], UA 03/31/2021 1.029  1.000 - 1.030 Final    Urine Hgb 03/31/2021 NEGATIVE  NEGATIVE Final    pH, UA 03/31/2021 6.0  5.0 - 8.0 Final    Protein, UA 03/31/2021 TRACE* NEGATIVE Final    Urobilinogen, Urine 03/31/2021 Normal  Normal Final    Nitrite, Urine 03/31/2021 NEGATIVE  NEGATIVE Final    Leukocyte Esterase, Urine 03/31/2021 NEGATIVE  NEGATIVE Final    Urinalysis Comments 03/31/2021 NOT REPORTED   Final    TSH 04/01/2021 0.32  0.30 - 5.00 mIU/L Final    HIV Ag/Ab 04/01/2021 NONREACTIVE  NONREACTIVE Final    Comment: No laboratory evidence of HIV change  Medication changes: Continue regimen and observe for tolerability and improvement in symptoms  Attempt to develop insight  Psycho-education conducted. Supportive Therapy conducted. Probable discharge is per attending MD  Follow-up daily while inpatient      Electronically signed by STACI Choe CNP on 4/1/21 at 2:31 PM EDT    **This report has been created using voice recognition software. It may contain minor errors which are inherent in voice recognition technology. **    I independently saw and evaluated the patient. I reviewed the nurse practitioners documentation above. Any additional comments or changes to the nurse practitioners documentation are stated below otherwise agree with assessment. Plan will be as follows:  Patient struggling with cholesterol right now. At one point stated she wanted to give up. Had under the blanket nearly the entire interview. Poor eye contact. Renews her commitment to rehab and sobriety. PLAN  Patient s symptoms   show no change  Continue with supportive measures through withdrawal  Consider depression medication/bipolar medication pending exiting withdrawal  Attempt to develop insight  Psycho-education conducted. Supportive Therapy conducted.   Probable discharge is undetermined at this time  Follow-up daily while on inpatient unit

## 2021-04-01 NOTE — PLAN OF CARE
Problem: Altered Mood, Depressive Behavior:  Goal: Able to verbalize and/or display a decrease in depressive symptoms  Description: Able to verbalize and/or display a decrease in depressive symptoms  4/1/2021 1406 by Kathy Churchill RN  Outcome: Ongoing  Pt isolative to room. She is currently going through withdrawals and not feeling well. She is accepting of medications, vitals and assessments. Problem: Altered Mood, Depressive Behavior:  Goal: Absence of self-harm  Description: Absence of self-harm  4/1/2021 1406 by Kathy Angeles RN  Outcome: Ongoing  Pt denied current suicidal ideations. She did not have any episodes of self harm this shift. Problem: Substance Abuse:  Goal: Absence of drug withdrawal signs and symptoms  Description: Absence of drug withdrawal signs and symptoms  4/1/2021 1406 by Kathy Churchill RN  Outcome: Ongoing  Pt is currently having active withdrawals symptoms. She is accepting of medications. She was encouraged to drink and eat. She states that she has been able to drink a little bit of water. Problem: Tobacco Use:  Goal: Inpatient tobacco use cessation counseling participation  Description: Inpatient tobacco use cessation counseling participation  4/1/2021 1406 by Kathy Angeles RN  Outcome: Ongoing  Pt does not want to quit using tobacco at this time. Problem: Pain:  Goal: Pain level will decrease  Description: Pain level will decrease  4/1/2021 1257 by SLOAN Rios  Outcome: Ongoing  Pt continues to feel pain and achy at this time. Problem: Pain:  Goal: Control of acute pain  Description: Control of acute pain  4/1/2021 1257 by SLOAN Rios  Outcome: Ongoing  Pt states that she is currently having muscle spasms at this time.

## 2021-04-01 NOTE — GROUP NOTE
Group Therapy Note    Date: 4/1/2021    Group Start Time: 1330  Group End Time: 8610  Group Topic: Music Therapy    LULA BHLAKE C    Manny Lopez        Group Therapy Note  Pt did not attend music therapy group d/t resting in room despite staff invitation to attend. 1:1 talk time offered as alternative to group session, pt declined.

## 2021-04-01 NOTE — GROUP NOTE
Group Therapy Note    Date: 4/1/2021    Group Start Time: 0900  Group End Time: 0930  Group Topic: Community Meeting    [facility]    Patient refused to attend community meeting and goal setting group at 0900 after encouragement from staff. 1:1 talk time offered by staff as alternative to group session.

## 2021-04-01 NOTE — PLAN OF CARE
Patient was not able to verbalize a decrease in depressive symptoms. Patient remains free from self harm.

## 2021-04-01 NOTE — GROUP NOTE
Group Therapy Note    Date: 4/1/2021    Group Start Time: 1000  Group End Time: 6143  Group Topic: Psychotherapy    [address], LSW    patient refused to attend psychotherapy group at [facility] after encouragement from staff.   1:1 talk time provided as alternative to group session

## 2021-04-02 PROCEDURE — 6370000000 HC RX 637 (ALT 250 FOR IP): Performed by: PSYCHIATRY & NEUROLOGY

## 2021-04-02 PROCEDURE — 99232 SBSQ HOSP IP/OBS MODERATE 35: CPT | Performed by: PSYCHIATRY & NEUROLOGY

## 2021-04-02 PROCEDURE — 6370000000 HC RX 637 (ALT 250 FOR IP): Performed by: NURSE PRACTITIONER

## 2021-04-02 PROCEDURE — 1240000000 HC EMOTIONAL WELLNESS R&B

## 2021-04-02 PROCEDURE — 99231 SBSQ HOSP IP/OBS SF/LOW 25: CPT | Performed by: INTERNAL MEDICINE

## 2021-04-02 RX ORDER — RISPERIDONE 1 MG/1
0.5 TABLET, FILM COATED ORAL 2 TIMES DAILY
Status: DISCONTINUED | OUTPATIENT
Start: 2021-04-03 | End: 2021-04-03

## 2021-04-02 RX ADMIN — CYCLOBENZAPRINE 10 MG: 10 TABLET, FILM COATED ORAL at 14:07

## 2021-04-02 RX ADMIN — CLONIDINE HYDROCHLORIDE 0.1 MG: 0.1 TABLET ORAL at 21:10

## 2021-04-02 RX ADMIN — DICYCLOMINE HYDROCHLORIDE 10 MG: 10 CAPSULE ORAL at 14:07

## 2021-04-02 RX ADMIN — TRAZODONE HYDROCHLORIDE 50 MG: 50 TABLET ORAL at 21:11

## 2021-04-02 RX ADMIN — HYDROXYZINE HYDROCHLORIDE 50 MG: 50 TABLET, FILM COATED ORAL at 14:07

## 2021-04-02 RX ADMIN — HYDROXYZINE HYDROCHLORIDE 50 MG: 50 TABLET, FILM COATED ORAL at 21:11

## 2021-04-02 NOTE — GROUP NOTE
Group Therapy Note    Date: 4/2/2021    Group Start Time: 1600  Group End Time: 36  Group Topic: Healthy Living/Wellness    [address], RN        Group Therapy Note    Attendees: 10/18         Patient's Goal:  Increase intrapersonal skills, develop new coping skills    Notes:  Progressing towards goal    Status After Intervention:  Improved    Participation Level:  Active Listener    Participation Quality: Appropriate      Speech:  normal      Thought Process/Content: Logical      Affective Functioning: Congruent      Mood: calm      Level of consciousness:  Alert and Oriented x4      Response to Learning: Able to verbalize current knowledge/experience, Able to verbalize/acknowledge new learning, Able to retain information and Progressing to goal      Endings: None Reported    Modes of Intervention: Education, Support, Socialization, Exploration and Problem-solving      Discipline Responsible: Registered Nurse      Signature:  Maru Pompa RN

## 2021-04-02 NOTE — PROGRESS NOTES
negative for fevers, chills, sweats, fatigue, weight loss  HEENT:  negative for vision, hearing changes, runny nose, throat pain  RESPIRATORY:  negative for shortness of breath, cough, congestion, wheezing. CARDIOVASCULAR:  negative for chest pain, palpitations. GASTROINTESTINAL: Abdominal cramps, loose stools  GENITOURINARY:  negative for difficulty of urination, burning with urination, frequency   INTEGUMENT:  negative for rash, skin lesions, easy bruising   HEMATOLOGIC/LYMPHATIC:  negative for swelling/edema   ALLERGIC/IMMUNOLOGIC:  negative for urticaria , itching  ENDOCRINE:  negative increase in drinking, increase in urination, hot or cold intolerance  MUSCULOSKELETAL:  negative joint pains, muscle aches, swelling of joints  NEUROLOGICAL:  negative for headaches, dizziness, lightheadedness, numbness, pain, tingling extremities  BEHAVIOR/PSYCH:      Physical Exam:     BP (!) 96/52   Pulse 96   Temp 98.3 °F (36.8 °C) (Oral)   Resp 14   Ht 5' 4\" (1.626 m)   Wt 130 lb (59 kg)   LMP 2010 (Within Weeks)   SpO2 100%   BMI 22.31 kg/m²   Temp (24hrs), Av.3 °F (36.8 °C), Min:98.2 °F (36.8 °C), Max:98.3 °F (36.8 °C)    No results for input(s): POCGLU in the last 72 hours. No intake or output data in the 24 hours ending 21 1636    General Appearance:  alert, well appearing, and in no acute distress  Mental status: oriented to person, place, and time with normal affect  Head:  normocephalic, atraumatic. Eye: no icterus, redness, pupils equal and reactive, extraocular eye movements intact, conjunctiva clear  Ear: normal external ear, no discharge, hearing intact  Nose:  no drainage noted  Mouth: mucous membranes moist  Neck: supple, no carotid bruits, thyroid not palpable  Lungs: Bilateral equal air entry, clear to ausculation, no wheezing, rales or rhonchi, normal effort  Cardiovascular: normal rate, regular rhythm, no murmur, gallop, rub.   Abdomen: Soft, nontender, nondistended, normal bowel sounds, no hepatomegaly or splenomegaly  Neurologic: There are no new focal motor or sensory deficits, normal muscle tone and bulk, no abnormal sensation, normal speech, cranial nerves II through XII grossly intact  Skin: No gross lesions, rashes, bruising or bleeding on exposed skin area  Extremities:  peripheral pulses palpable, no pedal edema or calf pain with palpation  Psych: Investigations:      Laboratory Testing:  No results found for this or any previous visit (from the past 24 hour(s)). Consultations:   IP CONSULT TO INTERNAL MEDICINE  Assessment :      Primary Problem  Bipolar 1 disorder, depressed ([facility]    Active Hospital Problems    Diagnosis Date Noted    Hyponatremia [E87.1] 04/01/2021    Hypokalemia [E87.6] 04/01/2021    Abnormal urinalysis [R82.90] 04/01/2021    Depression with suicidal ideation [F32.9, R45.851] 03/31/2021    Bipolar 1 disorder, depressed ([facility] [F31.9] 03/31/2021    Opioid withdrawal ([facility] [F11.23] 03/31/2021    Amphetamine abuse, continuous ([facility] [F15.10] 03/31/2021       Plan:     1. Hyponatremia, hypokalemia, possibility of dehydration, repeat testing, potassium and sodium is in normal range  2. Abnormal UA, denying complaint of dysuria, nitrites, leukocyte esterase is negative, 5-10 epithelial cells in UA possible contaminated sample  3. Slightly elevated platelets, no further work-up is required  4.   4/2  Patient feeling better  No new complaints  No complaints of dysuria  Readings of low blood pressure but patient is asymptomatic  We will sign off, please call with questions      Gab Soares MD  4/2/2021  4:36 PM    Copy sent to Dr. Eloy Burkitt primary care provider on file. Please note that this chart was generated using voice recognition Dragon dictation software. Although every effort was made to ensure the accuracy of this automated transcription, some errors in transcription may have occurred.

## 2021-04-02 NOTE — GROUP NOTE
Group Therapy Note    Date: 4/2/2021    Group Start Time: 1330  Group End Time: 9671  Group Topic: Psychoeducation    LULA De La Torre, CTRS    Patient refused to attend socialization skills group at 1330 after encouragement from staff. 1:1 talk time offered by staff as alternative to group session.

## 2021-04-02 NOTE — PLAN OF CARE
Problem: Altered Mood, Depressive Behavior:  Goal: Able to verbalize and/or display a decrease in depressive symptoms  Description: Able to verbalize and/or display a decrease in depressive symptoms  Outcome: Ongoing   Patient denies depression. Patient admits to increased anxiety at this time. Patient has been isolative to room this am.  Problem: Altered Mood, Depressive Behavior:  Goal: Absence of self-harm  Description: Absence of self-harm  Outcome: Ongoing   Patient denies Suicidal Ideations and agrees to approach staff if having any thoughts to harm self. Q 15 min safety checks continue at this time. Problem: Substance Abuse:  Goal: Absence of drug withdrawal signs and symptoms  Description: Absence of drug withdrawal signs and symptoms  Outcome: Ongoing  Patient continues to be going through withdrawals. Problem: Pain:  Goal: Pain level will decrease  Description: Pain level will decrease  Outcome: Ongoing     Problem: Pain:  Goal: Control of acute pain  Description: Control of acute pain  Outcome: Ongoing     Problem: Pain:  Goal: Control of chronic pain  Description: Control of chronic pain  Outcome: Ongoing   Patient denies any pain at this time.

## 2021-04-02 NOTE — CARE COORDINATION
SW met with patient this date regarding discharge plans. Pt provided AOD resource packet, SW offered information on inpatient treatment, recovery housing, pt states she wants to seek IOP program. SW offered resources, pt requests to be linked with Aundrea Waqas, lives in [place]. SW contacted [facility] who will assist in linking patient to IOP program, pt has out of state medicaid and also will need [place] medicaid.

## 2021-04-02 NOTE — GROUP NOTE
Group Therapy Note    Date: 4/2/2021    Group Start Time: 1000  Group End Time: 0648  Group Topic: Psychoeducation    [facility]    Patient refused to attend relaxation and stress management group at 1000 after encouragement from staff. 1:1 talk time offered by staff as alternative to group session.

## 2021-04-02 NOTE — CARE COORDINATION
patient refused to attend 11:00 am psychotherapy after encouragement from staff. 1:1 time was offered.

## 2021-04-02 NOTE — PROGRESS NOTES
Daily Progress Note  4/2/2021  CHIEF COMPLAINT: Suicidal ideation and polysubstance use    Reviewed patient's current plan of care and vital signs with nursing staff. Sleep: Poor, few hours previous evening  Attending groups: No    SUBJECTIVE:    Patient is in her room resting at start of assessment. She is agreeable to discussion, and lifts her head out of the blankets. She appears disheveled. Minimally engaged in conversation. States \"my withdrawals are over and I am ready to go\". Patient appears irritable and anxious. Discussed plans post discharge, and patient states that she would like to return home and go to an outpatient treatment program.  States that she has not been provided with resources or facility phone numbers to start making phone calls. She is agreeable to speak with social work. Very minimizing of stressors, depression, and life situation. Evasive in responding to questions regarding mental status. Walked out of room during assessment and states that she just needs to figure out her plan and does not need to speak with this author. She ended interview at that time. Per review of documentation, patient did meet with  who linked her with resources and identified that patient needs [place]. Patient reporting improvement in suicidal ideation. She is able to contract for safety while on the unit. Continues to deny need for psychotropic medications. Per review of MAR, patient had been utilizing Tylenol, Flexeril, Bentyl, Atarax, Motrin, and trazodone within the last 24 hours. Patient's blood pressure continues to be hypotensive, but is improved since yesterday. Staff report that she ate with encouragement, but eats very little and consumes few fluids. Needs frequent reminders for oral intake. We will continue inpatient treatment and assess for stability and provide support.     Mental Status Exam  Level of consciousness: Alert and awake  Appearance: Hospital attire, standing, with poor grooming and hygiene   Behavior/Motor: Psychomotor retardation  Attitude toward examiner: Semicooperative, no eye contact, agitated  Speech: Delayed responses, slow rate, low volume, well articulated  Mood: \"Fine, what ever\"  Affect: Reactive at times  Thought processes:  linear, goal directed and coherent  Thought content:  denies homicidal ideation  Suicidal Ideation: Improving suicidal ideation, able to contract for safety on unit  Delusions:  no evidence of delusions  Perceptual Disturbance:  denies any perceptual disturbance  Cognition:  Oriented to self, location, time, and situation  Memory: age appropriate  Insight & Judgement: improving  Medication side effects:  denies       Data   height is 5' 4\" (1.626 m) and weight is 130 lb (59 kg). Her oral temperature is 98.3 °F (36.8 °C). Her blood pressure is 94/54 (abnormal) and her pulse is 96. Her respiration is 14 and oxygen saturation is 100%.    Labs:   Admission on 03/31/2021   Component Date Value Ref Range Status    WBC 03/31/2021 8.1  3.5 - 11.0 k/uL Final    RBC 03/31/2021 4.57  4.0 - 5.2 m/uL Final    Hemoglobin 03/31/2021 12.4  12.0 - 16.0 g/dL Final    Hematocrit 03/31/2021 37.4  36 - 46 % Final    MCV 03/31/2021 81.9  80 - 100 fL Final    MCH 03/31/2021 27.2  26 - 34 pg Final    MCHC 03/31/2021 33.2  31 - 37 g/dL Final    RDW 03/31/2021 14.7  11.5 - 14.9 % Final    Platelets 39/00/3382 503* 150 - 450 k/uL Final    MPV 03/31/2021 7.7  6.0 - 12.0 fL Final    NRBC Automated 03/31/2021 NOT REPORTED  per 100 WBC Final    Differential Type 03/31/2021 NOT REPORTED   Final    Seg Neutrophils 03/31/2021 46  36 - 66 % Final    Lymphocytes 03/31/2021 41  24 - 44 % Final    Monocytes 03/31/2021 9* 1 - 7 % Final    Eosinophils % 03/31/2021 3  0 - 4 % Final    Basophils 03/31/2021 1  0 - 2 % Final    Immature Granulocytes 03/31/2021 NOT REPORTED  0 % Final    Segs Absolute 03/31/2021 3.80  1.3 - 9.1 k/uL Final    Absolute Lymph # 03/31/2021 3.30  1.0 - 4.8 k/uL Final    Absolute Mono # 03/31/2021 0.70  0.1 - 1.3 k/uL Final    Absolute Eos # 03/31/2021 0.30  0.0 - 0.4 k/uL Final    Basophils Absolute 03/31/2021 0.00  0.0 - 0.2 k/uL Final    Absolute Immature Granulocyte 03/31/2021 NOT REPORTED  0.00 - 0.30 k/uL Final    WBC Morphology 03/31/2021 NOT REPORTED   Final    RBC Morphology 03/31/2021 NOT REPORTED   Final    Platelet Estimate 81/71/8392 NOT REPORTED   Final    Glucose 03/31/2021 98  70 - 99 mg/dL Final    BUN 03/31/2021 14  6 - 20 mg/dL Final    CREATININE 03/31/2021 0.76  0.50 - 0.90 mg/dL Final    Bun/Cre Ratio 03/31/2021 NOT REPORTED  9 - 20 Final    Calcium 03/31/2021 8.9  8.6 - 10.4 mg/dL Final    Sodium 03/31/2021 132* 135 - 144 mmol/L Final    Potassium 03/31/2021 3.7  3.7 - 5.3 mmol/L Final    Chloride 03/31/2021 97* 98 - 107 mmol/L Final    CO2 03/31/2021 27  20 - 31 mmol/L Final    Anion Gap 03/31/2021 8* 9 - 17 mmol/L Final    Alkaline Phosphatase 03/31/2021 89  35 - 104 U/L Final    ALT 03/31/2021 10  5 - 33 U/L Final    AST 03/31/2021 18  <32 U/L Final    Total Bilirubin 03/31/2021 0.26* 0.3 - 1.2 mg/dL Final    Total Protein 03/31/2021 8.1  6.4 - 8.3 g/dL Final    Albumin 03/31/2021 3.9  3.5 - 5.2 g/dL Final    Albumin/Globulin Ratio 03/31/2021 NOT REPORTED  1.0 - 2.5 Final    GFR Non- 03/31/2021 >60  >60 mL/min Final    GFR  03/31/2021 >60  >60 mL/min Final    GFR Comment 03/31/2021        Final    Comment: Average GFR for 30-36 years old:   80 mL/min/1.73sq m  Chronic Kidney Disease:   <60 mL/min/1.73sq m  Kidney failure:   <15 mL/min/1.73sq m              eGFR calculated using average adult body mass.  Additional eGFR calculator available at:        [employer]            GFR Staging 03/31/2021 NOT REPORTED   Final    hCG Qual 03/31/2021 NEGATIVE  NEGATIVE Final    Comment: Specimens with hCG levels near the threshold of the test (25 mIU/mL) may give a negative or   indeterminate result. In such cases, another test should be performed with a new specimen   in 48-72 hours. If early pregnancy is suspected clinically in this setting, correlation   with quantitative serum b-hCG level is suggested.  Ethanol 03/31/2021 <10  <10 mg/dL Final    Ethanol percent 03/31/2021 <0.010  % Final    Acetaminophen Level 03/31/2021 <5* 10 - 30 ug/mL Final    Salicylate Lvl 59/47/5714 <1* 3 - 10 mg/dL Final    Specimen Description 03/31/2021 . NASOPHARYNGEAL SWAB   Final    SARS-CoV-2, Rapid 03/31/2021 Not Detected  Not Detected Final    Comment:       Rapid NAAT:  The specimen is NEGATIVE for SARS-CoV-2, the novel coronavirus associated with   COVID-19. The ID NOW COVID-19 assay is designed to detect the virus that causes COVID-19 in patients   with signs and symptoms of infection who are suspected of COVID-19. An individual without symptoms of COVID-19 and who is not shedding SARS-CoV-2 virus would   expect to have a negative (not detected) result in this assay. Negative results should be treated as presumptive and, if inconsistent with clinical signs   and symptoms or necessary for patient management,  should be tested with an alternative molecular assay. Negative results do not preclude   SARS-CoV-2 infection and   should not be used as the sole basis for patient management decisions.          Fact sheet for Healthcare Providers: Ainsley.es  Fact sheet for Patients: Ainsley.es          Methodology: Isothermal Nucleic Acid Amplification      Amphetamine Screen, Ur 03/31/2021 POSITIVE* NEGATIVE Final    Comment:       (Positive cutoff 1000 ng/mL)                  Barbiturate Screen, Ur 03/31/2021 NEGATIVE  NEGATIVE Final    Comment:       (Positive cutoff 200 ng/mL)                  Benzodiazepine Screen, Urine 03/31/2021 NEGATIVE  NEGATIVE Final Comment:       (Positive cutoff 200 ng/mL)                  Cocaine Metabolite, Urine 03/31/2021 NEGATIVE  NEGATIVE Final    Comment:       (Positive cutoff 300 ng/mL)                  Methadone Screen, Urine 03/31/2021 NEGATIVE  NEGATIVE Final    Comment:       (Positive cutoff 300 ng/mL)                  Opiates, Urine 03/31/2021 POSITIVE* NEGATIVE Final    Comment:       (Positive cutoff 300 ng/mL)                  Phencyclidine, Urine 03/31/2021 NEGATIVE  NEGATIVE Final    Comment:       (Positive cutoff 25 ng/mL)                  Propoxyphene, Urine 03/31/2021 NOT REPORTED  NEGATIVE Final    Cannabinoid Scrn, Ur 03/31/2021 NEGATIVE  NEGATIVE Final    Comment:       (Positive cutoff 50 ng/mL)                  Oxycodone Screen, Ur 03/31/2021 NEGATIVE  NEGATIVE Final    Comment:       (Positive cutoff 100 ng/mL)                  Methamphetamine, Urine 03/31/2021 NOT REPORTED  NEGATIVE Final    Tricyclic Antidepressants, Urine 03/31/2021 NOT REPORTED  NEGATIVE Final    MDMA, Urine 03/31/2021 NOT REPORTED  NEGATIVE Final    Buprenorphine Urine 03/31/2021 NOT REPORTED  NEGATIVE Final    Test Information 03/31/2021 Assay provides medical screening only. The absence of expected drug(s) and/or metabolite(s) may indicate diluted or adulterated urine, limitations of testing or timing of collection. Final    Comment: Testing for legal purposes should be confirmed by another method. To request confirmation   of test result, please call the lab within 7 days of sample submission.       Color, UA 03/31/2021 DARK YELLOW* YELLOW Final    Turbidity UA 03/31/2021 CLEAR  CLEAR Final    Glucose, Ur 03/31/2021 NEGATIVE  NEGATIVE Final    Bilirubin Urine 03/31/2021 NEGATIVE  NEGATIVE Final    Ketones, Urine 03/31/2021 NEGATIVE  NEGATIVE Final    Specific [place], UA 03/31/2021 1.029  1.000 - 1.030 Final    Urine Hgb 03/31/2021 NEGATIVE  NEGATIVE Final    pH, UA 03/31/2021 6.0  5.0 - 8.0 Final    Protein, UA 03/31/2021 TRACE* NEGATIVE Final    Urobilinogen, Urine 03/31/2021 Normal  Normal Final    Nitrite, Urine 03/31/2021 NEGATIVE  NEGATIVE Final    Leukocyte Esterase, Urine 03/31/2021 NEGATIVE  NEGATIVE Final    Urinalysis Comments 03/31/2021 NOT REPORTED   Final    TSH 04/01/2021 0.32  0.30 - 5.00 mIU/L Final    HIV Ag/Ab 04/01/2021 NONREACTIVE  NONREACTIVE Final    Comment: No laboratory evidence of HIV infection. If acute HIV infection is suspected, consider   testing for HIV-1 RNA.  T. pallidum, IgG 04/01/2021 NONREACTIVE  NONREACTIVE Final    Comment:       T. pallidum antibodies are not detected. There is no serological evidence of infection with T. pallidum (early primary syphilis   cannot be excluded). Retest in 2-4 weeks if syphilis is clinically suspect.  - 03/31/2021        Final    WBC, UA 03/31/2021 2 TO 5  /HPF Final    RBC, UA 03/31/2021 2 TO 5  /HPF Final    Casts UA 03/31/2021 NOT REPORTED  /LPF Final    Crystals, UA 03/31/2021 NOT REPORTED  None /HPF Final    Epithelial Cells UA 03/31/2021 5 TO 10  /HPF Final    Renal Epithelial, UA 03/31/2021 NOT REPORTED  0 /HPF Final    Bacteria, UA 03/31/2021 MANY* None Final    Mucus, UA 03/31/2021 3+* None Final    Trichomonas, UA 03/31/2021 NOT REPORTED  None Final    Amorphous, UA 03/31/2021 1+* None Final    Other Observations UA 03/31/2021 NOT REPORTED  NOT REQ.  Final    Yeast, UA 03/31/2021 NOT REPORTED  None Final    Glucose 04/01/2021 93  70 - 99 mg/dL Final    BUN 04/01/2021 19  6 - 20 mg/dL Final    CREATININE 04/01/2021 0.67  0.50 - 0.90 mg/dL Final    Bun/Cre Ratio 04/01/2021 NOT REPORTED  9 - 20 Final    Calcium 04/01/2021 9.0  8.6 - 10.4 mg/dL Final    Sodium 04/01/2021 136  135 - 144 mmol/L Final    Potassium 04/01/2021 3.8  3.7 - 5.3 mmol/L Final    SPECIMEN MODERATELY HEMOLYZED, RESULTS MAY BE ADVERSELY AFFECTED    Chloride 04/01/2021 101  98 - 107 mmol/L Final    CO2 04/01/2021 24  20 - 31 mmol/L Final    Anion Gap 04/01/2021 11  9 - 17 mmol/L Final    GFR Non- 04/01/2021 >60  >60 mL/min Final    GFR  04/01/2021 >60  >60 mL/min Final    GFR Comment 04/01/2021        Final    Comment: Average GFR for 30-36 years old:   107 mL/min/1.73sq m  Chronic Kidney Disease:   <60 mL/min/1.73sq m  Kidney failure:   <15 mL/min/1.73sq m              eGFR calculated using average adult body mass. Additional eGFR calculator available at:        [email or web address]            GFR Staging 04/01/2021 NOT REPORTED   Final            Medications  Current Facility-Administered Medications: acetaminophen (TYLENOL) tablet 650 mg, 650 mg, Oral, Q4H PRN  aluminum & magnesium hydroxide-simethicone (MAALOX) 200-200-20 MG/5ML suspension 30 mL, 30 mL, Oral, Q6H PRN  hydrOXYzine (ATARAX) tablet 50 mg, 50 mg, Oral, TID PRN  ibuprofen (ADVIL;MOTRIN) tablet 400 mg, 400 mg, Oral, Q6H PRN  nicotine polacrilex (NICORETTE) gum 2 mg, 2 mg, Oral, PRN  polyethylene glycol (GLYCOLAX) packet 17 g, 17 g, Oral, Daily PRN  traZODone (DESYREL) tablet 50 mg, 50 mg, Oral, Nightly PRN  dicyclomine (BENTYL) capsule 10 mg, 10 mg, Oral, TID PRN  loperamide (IMODIUM) capsule 2 mg, 2 mg, Oral, 4x Daily PRN  ondansetron (ZOFRAN) tablet 4 mg, 4 mg, Oral, Q8H PRN  cyclobenzaprine (FLEXERIL) tablet 10 mg, 10 mg, Oral, TID PRN  cloNIDine (CATAPRES) tablet 0.1 mg, 0.1 mg, Oral, TID    ASSESSMENT  Bipolar 1 disorder, depressed (HCC)     PLAN  Patient symptoms show improvement  Medication changes: Continue regimen and observe for tolerability and improvement in symptoms  Assessed for desire for medication to help with mood stability, and patient denies  Attempt to develop insight  Psycho-education conducted. Supportive Therapy conducted.   Probable discharge is per attending MD  Follow-up daily while inpatient      Electronically signed by STACI Valdez CNP on 4/2/21       I independently saw and evaluated the patient. I reviewed the nurse practitioners documentation above. Any additional comments or changes to the nurse practitioners documentation are stated below otherwise agree with assessment. Plan will be as follows:  Patient withdrawal symptoms improving. Discussed with her that we would move forward with trying to treat her mood symptoms now. Patient still reporting depression. Denying suicidal intent or plan here on the unit at present time. Wants to move forward with IOP rehab at present time. Discussed medication for treating her bipolar disorder. PLAN  Patient s symptoms   are improving  Add Risperdal 0.5 mg p.o. twice daily  Attempt to develop insight  Psycho-education conducted. Supportive Therapy conducted.   Probable discharge is 2 to 3 days pending stability  Follow-up daily while on inpatient unit

## 2021-04-03 LAB
FENTANYL AND METABOLITES, URINE: 469.1 NG/ML
NORFENTANYL, URINE: >1000 NG/ML

## 2021-04-03 PROCEDURE — 1240000000 HC EMOTIONAL WELLNESS R&B

## 2021-04-03 PROCEDURE — 6370000000 HC RX 637 (ALT 250 FOR IP): Performed by: NURSE PRACTITIONER

## 2021-04-03 PROCEDURE — 6370000000 HC RX 637 (ALT 250 FOR IP): Performed by: PSYCHIATRY & NEUROLOGY

## 2021-04-03 PROCEDURE — 99232 SBSQ HOSP IP/OBS MODERATE 35: CPT | Performed by: NURSE PRACTITIONER

## 2021-04-03 RX ORDER — RISPERIDONE 1 MG/1
1 TABLET, FILM COATED ORAL 2 TIMES DAILY
Status: DISCONTINUED | OUTPATIENT
Start: 2021-04-03 | End: 2021-04-05 | Stop reason: HOSPADM

## 2021-04-03 RX ADMIN — RISPERIDONE 1 MG: 1 TABLET ORAL at 21:27

## 2021-04-03 RX ADMIN — RISPERIDONE 0.5 MG: 1 TABLET ORAL at 10:02

## 2021-04-03 RX ADMIN — NICOTINE POLACRILEX 2 MG: 2 GUM, CHEWING BUCCAL at 10:03

## 2021-04-03 RX ADMIN — TRAZODONE HYDROCHLORIDE 50 MG: 50 TABLET ORAL at 21:27

## 2021-04-03 RX ADMIN — NICOTINE POLACRILEX 2 MG: 2 GUM, CHEWING BUCCAL at 18:02

## 2021-04-03 RX ADMIN — ACETAMINOPHEN 650 MG: 325 TABLET, FILM COATED ORAL at 21:27

## 2021-04-03 RX ADMIN — NICOTINE POLACRILEX 2 MG: 2 GUM, CHEWING BUCCAL at 14:48

## 2021-04-03 RX ADMIN — CYCLOBENZAPRINE 10 MG: 10 TABLET, FILM COATED ORAL at 21:27

## 2021-04-03 RX ADMIN — HYDROXYZINE HYDROCHLORIDE 50 MG: 50 TABLET, FILM COATED ORAL at 21:27

## 2021-04-03 ASSESSMENT — PAIN SCALES - GENERAL
PAINLEVEL_OUTOF10: 0
PAINLEVEL_OUTOF10: 2

## 2021-04-03 NOTE — BH NOTE
Patient refused to attend wellness group at 1600 after encouragement from staff. 1:1 talk time offered as alternative to group session.

## 2021-04-03 NOTE — GROUP NOTE
Group Therapy Note    Date: 4/3/2021    Group Start Time: 0900  Group End Time: 0920  Group Topic: Community Meeting    [address] [place] [address] Rebeca [address]    Patient declined to attend community meeting/goal setting group at 0900 despite encouragement from staff. 1:1 talk time offered by staff as alternative to group session.       Signature:  Taft Scheuermann

## 2021-04-03 NOTE — PLAN OF CARE
Problem: Altered Mood, Depressive Behavior:  Goal: Able to verbalize and/or display a decrease in depressive symptoms  Description: Able to verbalize and/or display a decrease in depressive symptoms  4/3/2021 1214 by Vane May  Outcome: Ongoing   Patient appears depressed, flat, anxious. Patient has been isolative to her room. Patient states she  wants to be discharged home. Problem: Altered Mood, Depressive Behavior:  Goal: Absence of self-harm  Description: Absence of self-harm  4/3/2021 1214 by Vane May  Outcome: Ongoing  Patient denies thoughts of self harm. Q 15 safety checks. Safe environment is maintained. Problem: Substance Abuse:  Goal: Absence of drug withdrawal signs and symptoms  Description: Absence of drug withdrawal signs and symptoms  4/3/2021 1214 by Vane May  Outcome: Ongoing   Patient requests withdrawal medication as needed. Patient is in her room asleep. Patient has no complaints of withdrawal symptoms at this time. Problem: Tobacco Use:  Goal: Inpatient tobacco use cessation counseling participation  Description: Inpatient tobacco use cessation counseling participation  4/3/2021 0016 by Dutch Augustine RN  Outcome: Ongoing     Problem: Pain:  Goal: Pain level will decrease  Description: Pain level will decrease  4/3/2021 0016 by Dutch Augustine RN  Outcome: Ongoing    Patient denies pain during assessment.

## 2021-04-03 NOTE — GROUP NOTE
Group Therapy Note    Date: 4/3/2021    Group Start Time: 1000  Group End Time: 1050  Group Topic: Psychoeducation    VLADIMIR Shin LSW        Group Therapy Note    Patient refused to attend psychoeducational group at 10am, despite encouragement.               Signature:  VLADIMIR Luong LSW

## 2021-04-03 NOTE — GROUP NOTE
Group Therapy Note    Date: 4/3/2021    Group Start Time: 1330  Group End Time: 3929  Group Topic: Cognitive Skills    LULA Melendez    Patient declined to attend social skills group at 1330 despite encouragement from staff. 1:1 talk time offered by staff as alternative to group session.         Signature:  Chuyita Melendez

## 2021-04-03 NOTE — PLAN OF CARE
Problem: Altered Mood, Depressive Behavior:  Goal: Able to verbalize and/or display a decrease in depressive symptoms  Description: Able to verbalize and/or display a decrease in depressive symptoms  4/3/2021 0016 by Jayy Duong RN  Outcome: Ongoing  Patient appears depressed, flat, anxious but is evasive on one to one attempt, abrupt, and reports to feeling better and wanting to go home, lacks insight into hospitalization and need for treatment. Problem: Altered Mood, Depressive Behavior:  Goal: Absence of self-harm  Description: Absence of self-harm  4/3/2021 0016 by Jayy Duong RN  Outcome: Ongoing  Patient denies thoughts of self harm and is agreeable to seeking out should thoughts of self harm arise. Safe environment maintained. 15 minute checks for safety continued per unit policy. Will continue to monitor for safety and provides support and reassurance as needed.         Problem: Substance Abuse:  Goal: Absence of drug withdrawal signs and symptoms  Description: Absence of drug withdrawal signs and symptoms  4/3/2021 0016 by Jayy Duong RN  Outcome: Ongoing  Patient requests as needed medication as appropriate for withdrawal      Problem: Tobacco Use:  Goal: Inpatient tobacco use cessation counseling participation  Description: Inpatient tobacco use cessation counseling participation  4/3/2021 0016 by Jayy Duong RN  Outcome: Ongoing     Problem: Pain:  Goal: Pain level will decrease  Description: Pain level will decrease  4/3/2021 0016 by Jayy Duong RN  Outcome: Ongoing  Patient denies pain on assessment

## 2021-04-03 NOTE — GROUP NOTE
Group Therapy Note    Date: 4/3/2021    Group Start Time: 1100  Group End Time: 1130  Group Topic: Healthy Living/Wellness    [address], RN        Group Therapy Note    Attendees: 11/19         Patient refused to attend Wellness group at 1100 after encouragement from staff. 1:1 talk time offered as alternative to group session.       Signature:  Susana Schreiber RN

## 2021-04-04 PROCEDURE — 99231 SBSQ HOSP IP/OBS SF/LOW 25: CPT | Performed by: NURSE PRACTITIONER

## 2021-04-04 PROCEDURE — 6370000000 HC RX 637 (ALT 250 FOR IP): Performed by: NURSE PRACTITIONER

## 2021-04-04 PROCEDURE — 6370000000 HC RX 637 (ALT 250 FOR IP): Performed by: PSYCHIATRY & NEUROLOGY

## 2021-04-04 PROCEDURE — 1240000000 HC EMOTIONAL WELLNESS R&B

## 2021-04-04 RX ADMIN — RISPERIDONE 1 MG: 1 TABLET ORAL at 21:15

## 2021-04-04 RX ADMIN — RISPERIDONE 1 MG: 1 TABLET ORAL at 08:03

## 2021-04-04 RX ADMIN — TRAZODONE HYDROCHLORIDE 50 MG: 50 TABLET ORAL at 21:15

## 2021-04-04 RX ADMIN — NICOTINE POLACRILEX 2 MG: 2 GUM, CHEWING BUCCAL at 21:17

## 2021-04-04 RX ADMIN — NICOTINE POLACRILEX 2 MG: 2 GUM, CHEWING BUCCAL at 16:17

## 2021-04-04 RX ADMIN — CYCLOBENZAPRINE 10 MG: 10 TABLET, FILM COATED ORAL at 21:15

## 2021-04-04 RX ADMIN — HYDROXYZINE HYDROCHLORIDE 50 MG: 50 TABLET, FILM COATED ORAL at 16:17

## 2021-04-04 NOTE — PROGRESS NOTES
Daily Progress Note  4/3/2021  CHIEF COMPLAINT: Suicidal ideation and polysubstance use    Reviewed patient's current plan of care and vital signs with nursing staff. Sleep: Poor, few hours previous evening  Attending groups: No    SUBJECTIVE:    Patient is resting in room at start of assessment and is dismissive of writer immediately. Does provide monosyllabic responses to questions for a short period of time, but keeps her head under the covers. She has been compliant with medications and denies any side effects to the newly started risperidone 0.5 mg. She has had only 1 dose at present time. Reporting improvement in suicidal thoughts, and is able to contract for safety on unit. States that she is doing better and is getting through her withdrawals fairly well. Patient has not utilized any of her as needed medications for withdrawal including Flexeril, Bentyl, Imodium or Atarax. Denies significant anxiety or feeling on edge. Requested to be left alone at that time. Later in the afternoon, patient states that she would like to be discharged so that she can see her family for the first time in 6 years. Stated the importance of stability, as this is the first day patient stating that she is verbalizing feeling well. Patient has shown minimal engagement in treatment, and has not been attending groups. Poor insight into her mental health. At this time, patient requires continued hospitalization for safety. She reports that she is coordinated intensive outpatient programming for polysubstance use at [facility]. Staff report that patient is eating minimally, but does. We will add Ensure to meals to help with oral intake. May consider dietary consult with continued poor oral intake.     Mental Status Exam  Level of consciousness: Alert and awake  Appearance: Hospital attire, laying in bed, with poor grooming and hygiene, head under blankets  Behavior/Motor: Psychomotor retardation  Attitude toward examiner: Semicooperative, no eye contact, agitated  Speech: Delayed responses, slow rate, low volume, well articulated  Mood: \"Good\"  Affect: Non congruent, blunted  Thought processes:  linear, goal directed and coherent  Thought content:  denies homicidal ideation  Suicidal Ideation: Improving suicidal ideation, able to contract for safety on unit  Delusions:  no evidence of delusions  Perceptual Disturbance:  denies any perceptual disturbance  Cognition:  Oriented to self, location, time, and situation  Memory: age appropriate  Insight & Judgement: improving  Medication side effects:  denies       Data   height is 5' 4\" (1.626 m) and weight is 130 lb (59 kg). Her oral temperature is 98.1 °F (36.7 °C). Her blood pressure is 120/70 and her pulse is 95. Her respiration is 14 and oxygen saturation is 100%.    Labs:   Admission on 03/31/2021   Component Date Value Ref Range Status    WBC 03/31/2021 8.1  3.5 - 11.0 k/uL Final    RBC 03/31/2021 4.57  4.0 - 5.2 m/uL Final    Hemoglobin 03/31/2021 12.4  12.0 - 16.0 g/dL Final    Hematocrit 03/31/2021 37.4  36 - 46 % Final    MCV 03/31/2021 81.9  80 - 100 fL Final    MCH 03/31/2021 27.2  26 - 34 pg Final    MCHC 03/31/2021 33.2  31 - 37 g/dL Final    RDW 03/31/2021 14.7  11.5 - 14.9 % Final    Platelets 46/64/1963 503* 150 - 450 k/uL Final    MPV 03/31/2021 7.7  6.0 - 12.0 fL Final    NRBC Automated 03/31/2021 NOT REPORTED  per 100 WBC Final    Differential Type 03/31/2021 NOT REPORTED   Final    Seg Neutrophils 03/31/2021 46  36 - 66 % Final    Lymphocytes 03/31/2021 41  24 - 44 % Final    Monocytes 03/31/2021 9* 1 - 7 % Final    Eosinophils % 03/31/2021 3  0 - 4 % Final    Basophils 03/31/2021 1  0 - 2 % Final    Immature Granulocytes 03/31/2021 NOT REPORTED  0 % Final    Segs Absolute 03/31/2021 3.80  1.3 - 9.1 k/uL Final    Absolute Lymph # 03/31/2021 3.30  1.0 - 4.8 k/uL Final    Absolute Mono # 03/31/2021 0.70  0.1 - 1.3 k/uL Final    Absolute Eos # 03/31/2021 0.30  0.0 - 0.4 k/uL Final    Basophils Absolute 03/31/2021 0.00  0.0 - 0.2 k/uL Final    Absolute Immature Granulocyte 03/31/2021 NOT REPORTED  0.00 - 0.30 k/uL Final    WBC Morphology 03/31/2021 NOT REPORTED   Final    RBC Morphology 03/31/2021 NOT REPORTED   Final    Platelet Estimate 78/77/8765 NOT REPORTED   Final    Glucose 03/31/2021 98  70 - 99 mg/dL Final    BUN 03/31/2021 14  6 - 20 mg/dL Final    CREATININE 03/31/2021 0.76  0.50 - 0.90 mg/dL Final    Bun/Cre Ratio 03/31/2021 NOT REPORTED  9 - 20 Final    Calcium 03/31/2021 8.9  8.6 - 10.4 mg/dL Final    Sodium 03/31/2021 132* 135 - 144 mmol/L Final    Potassium 03/31/2021 3.7  3.7 - 5.3 mmol/L Final    Chloride 03/31/2021 97* 98 - 107 mmol/L Final    CO2 03/31/2021 27  20 - 31 mmol/L Final    Anion Gap 03/31/2021 8* 9 - 17 mmol/L Final    Alkaline Phosphatase 03/31/2021 89  35 - 104 U/L Final    ALT 03/31/2021 10  5 - 33 U/L Final    AST 03/31/2021 18  <32 U/L Final    Total Bilirubin 03/31/2021 0.26* 0.3 - 1.2 mg/dL Final    Total Protein 03/31/2021 8.1  6.4 - 8.3 g/dL Final    Albumin 03/31/2021 3.9  3.5 - 5.2 g/dL Final    Albumin/Globulin Ratio 03/31/2021 NOT REPORTED  1.0 - 2.5 Final    GFR Non- 03/31/2021 >60  >60 mL/min Final    GFR  03/31/2021 >60  >60 mL/min Final    GFR Comment 03/31/2021        Final    Comment: Average GFR for 30-36 years old:   80 mL/min/1.73sq m  Chronic Kidney Disease:   <60 mL/min/1.73sq m  Kidney failure:   <15 mL/min/1.73sq m              eGFR calculated using average adult body mass. Additional eGFR calculator available at:        [employer]            GFR Staging 03/31/2021 NOT REPORTED   Final    hCG Qual 03/31/2021 NEGATIVE  NEGATIVE Final    Comment: Specimens with hCG levels near the threshold of the test (25 mIU/mL) may give a negative or   indeterminate result.   In such cases, another test should be Comment:       (Positive cutoff 300 ng/mL)                  Methadone Screen, Urine 03/31/2021 NEGATIVE  NEGATIVE Final    Comment:       (Positive cutoff 300 ng/mL)                  Opiates, Urine 03/31/2021 POSITIVE* NEGATIVE Final    Comment:       (Positive cutoff 300 ng/mL)                  Phencyclidine, Urine 03/31/2021 NEGATIVE  NEGATIVE Final    Comment:       (Positive cutoff 25 ng/mL)                  Propoxyphene, Urine 03/31/2021 NOT REPORTED  NEGATIVE Final    Cannabinoid Scrn, Ur 03/31/2021 NEGATIVE  NEGATIVE Final    Comment:       (Positive cutoff 50 ng/mL)                  Oxycodone Screen, Ur 03/31/2021 NEGATIVE  NEGATIVE Final    Comment:       (Positive cutoff 100 ng/mL)                  Methamphetamine, Urine 03/31/2021 NOT REPORTED  NEGATIVE Final    Tricyclic Antidepressants, Urine 03/31/2021 NOT REPORTED  NEGATIVE Final    MDMA, Urine 03/31/2021 NOT REPORTED  NEGATIVE Final    Buprenorphine Urine 03/31/2021 NOT REPORTED  NEGATIVE Final    Test Information 03/31/2021 Assay provides medical screening only. The absence of expected drug(s) and/or metabolite(s) may indicate diluted or adulterated urine, limitations of testing or timing of collection. Final    Comment: Testing for legal purposes should be confirmed by another method. To request confirmation   of test result, please call the lab within 7 days of sample submission.  Fentanyl and Metabolites, Urine 03/31/2021 469.1  ng/mL Final    Comment: (NOTE)  INTERPRETIVE INFORMATION: Fentanyl and Metabolite, Urine  Methodology: Quantitative Liquid Chromatography-Tandem Mass   Spectrometry  Positive cutoff: 1.0 ng/mL  For medical purposes only; not valid for forensic use. The absence of expected drug(s) and/or drug metabolite(s) may   indicate non-compliance, inappropriate timing of specimen   collection relative to drug administration, poor drug absorption,   diluted/adulterated urine, or limitations of testing.  The concentration value must be greater than or equal to the cutoff to   be reported as positive. Interpretive questions should be directed   to the laboratory. This test was developed and its performance characteristics   determined by [facility]. It has not been cleared or   approved by the [employer] and Drug Administration. This test was   performed in a CLIA certified laboratory and is intended for   clinical purposes.  Norfentanyl, Urine 03/31/2021 >1000.0  ng/mL Final    Comment: (NOTE)  Performed By: [facility] [address], [address] [facility]  : Donato Swan. Darryl Frost MD      Color, UA 03/31/2021 DARK YELLOW* YELLOW Final    Turbidity UA 03/31/2021 CLEAR  CLEAR Final    Glucose, Ur 03/31/2021 NEGATIVE  NEGATIVE Final    Bilirubin Urine 03/31/2021 NEGATIVE  NEGATIVE Final    Ketones, Urine 03/31/2021 NEGATIVE  NEGATIVE Final    Specific [place], UA 03/31/2021 1.029  1.000 - 1.030 Final    Urine Hgb 03/31/2021 NEGATIVE  NEGATIVE Final    pH, UA 03/31/2021 6.0  5.0 - 8.0 Final    Protein, UA 03/31/2021 TRACE* NEGATIVE Final    Urobilinogen, Urine 03/31/2021 Normal  Normal Final    Nitrite, Urine 03/31/2021 NEGATIVE  NEGATIVE Final    Leukocyte Esterase, Urine 03/31/2021 NEGATIVE  NEGATIVE Final    Urinalysis Comments 03/31/2021 NOT REPORTED   Final    TSH 04/01/2021 0.32  0.30 - 5.00 mIU/L Final    HIV Ag/Ab 04/01/2021 NONREACTIVE  NONREACTIVE Final    Comment: No laboratory evidence of HIV infection. If acute HIV infection is suspected, consider   testing for HIV-1 RNA.  T. pallidum, IgG 04/01/2021 NONREACTIVE  NONREACTIVE Final    Comment:       T. pallidum antibodies are not detected. There is no serological evidence of infection with T. pallidum (early primary syphilis   cannot be excluded). Retest in 2-4 weeks if syphilis is clinically suspect.             - 03/31/2021        Final    WBC, UA 03/31/2021 2 TO 5  /HPF Final    RBC, UA 03/31/2021 2 TO 5  /HPF Final    Casts UA 03/31/2021 NOT REPORTED  /LPF Final    Crystals, UA 03/31/2021 NOT REPORTED  None /HPF Final    Epithelial Cells UA 03/31/2021 5 TO 10  /HPF Final    Renal Epithelial, UA 03/31/2021 NOT REPORTED  0 /HPF Final    Bacteria, UA 03/31/2021 MANY* None Final    Mucus, UA 03/31/2021 3+* None Final    Trichomonas, UA 03/31/2021 NOT REPORTED  None Final    Amorphous, UA 03/31/2021 1+* None Final    Other Observations UA 03/31/2021 NOT REPORTED  NOT REQ. Final    Yeast, UA 03/31/2021 NOT REPORTED  None Final    Glucose 04/01/2021 93  70 - 99 mg/dL Final    BUN 04/01/2021 19  6 - 20 mg/dL Final    CREATININE 04/01/2021 0.67  0.50 - 0.90 mg/dL Final    Bun/Cre Ratio 04/01/2021 NOT REPORTED  9 - 20 Final    Calcium 04/01/2021 9.0  8.6 - 10.4 mg/dL Final    Sodium 04/01/2021 136  135 - 144 mmol/L Final    Potassium 04/01/2021 3.8  3.7 - 5.3 mmol/L Final    SPECIMEN MODERATELY HEMOLYZED, RESULTS MAY BE ADVERSELY AFFECTED    Chloride 04/01/2021 101  98 - 107 mmol/L Final    CO2 04/01/2021 24  20 - 31 mmol/L Final    Anion Gap 04/01/2021 11  9 - 17 mmol/L Final    GFR Non- 04/01/2021 >60  >60 mL/min Final    GFR  04/01/2021 >60  >60 mL/min Final    GFR Comment 04/01/2021        Final    Comment: Average GFR for 30-36 years old:   80 mL/min/1.73sq m  Chronic Kidney Disease:   <60 mL/min/1.73sq m  Kidney failure:   <15 mL/min/1.73sq m              eGFR calculated using average adult body mass.  Additional eGFR calculator available at:        [employer]            GFR Staging 04/01/2021 NOT REPORTED   Final            Medications  Current Facility-Administered Medications: risperiDONE (RISPERDAL) tablet 0.5 mg, 0.5 mg, Oral, BID  acetaminophen (TYLENOL) tablet 650 mg, 650 mg, Oral, Q4H PRN  aluminum & magnesium hydroxide-simethicone (MAALOX) 200-200-20 MG/5ML suspension 30 mL, 30 mL, Oral, Q6H PRN  hydrOXYzine (ATARAX) tablet 50 mg, 50 mg, Oral, TID PRN  ibuprofen (ADVIL;MOTRIN) tablet 400 mg, 400 mg, Oral, Q6H PRN  nicotine polacrilex (NICORETTE) gum 2 mg, 2 mg, Oral, PRN  polyethylene glycol (GLYCOLAX) packet 17 g, 17 g, Oral, Daily PRN  traZODone (DESYREL) tablet 50 mg, 50 mg, Oral, Nightly PRN  dicyclomine (BENTYL) capsule 10 mg, 10 mg, Oral, TID PRN  loperamide (IMODIUM) capsule 2 mg, 2 mg, Oral, 4x Daily PRN  ondansetron (ZOFRAN) tablet 4 mg, 4 mg, Oral, Q8H PRN  cyclobenzaprine (FLEXERIL) tablet 10 mg, 10 mg, Oral, TID PRN  cloNIDine (CATAPRES) tablet 0.1 mg, 0.1 mg, Oral, TID    ASSESSMENT  Bipolar 1 disorder, depressed (HCC)     PLAN  Patient symptoms show improvement  Medication changes: Titrate Risperidone 1 mg BID  Attempt to develop insight  Psycho-education conducted. Supportive Therapy conducted.   Probable discharge is per attending MD  Follow-up daily while inpatient      Electronically signed by STACI Tucker CNP on 4/3/21

## 2021-04-04 NOTE — GROUP NOTE
Group Therapy Note    Date: 4/4/2021    Group Start Time: 0900  Group End Time: 0024  Group Topic: Community Meeting    Rl Souza        Group Therapy Note    Attendees: 13/21         Pt did not attend [employer] d/t resting in room despite staff invitation to attend. 1:1 talk time offered as alternative to group session, pt declined.

## 2021-04-04 NOTE — GROUP NOTE
Group Therapy Note    Date: 4/4/2021    Group Start Time: 1000  Group End Time: 1100  Group Topic: Psychoeducation    LULA ARNOLD    VLADIMIR Oliva, GREGGW        Group Therapy Note    Attendees: 12         Patient's Goal:  Pt will demonstrate increased interpersonal interaction. Notes:  Group topic was Strengths.      Status After Intervention:  Improved    Participation Level: Interactive    Participation Quality: Appropriate      Speech:  normal      Thought Process/Content: Logical      Affective Functioning: Congruent      Mood: anxious      Level of consciousness:  Alert      Response to Learning: Progressing to goal      Endings: None Reported    Modes of Intervention: Education      Discipline Responsible: /Counselor      Signature:  VLADIMIR Oliva, CAPRI

## 2021-04-04 NOTE — GROUP NOTE
Group Therapy Note    Date: 4/4/2021    Group Start Time: 1600  Group End Time: 3439  Group Topic: Healthy Living/Wellness    [address], RN        Group Therapy Note    Attendees: 11/19           Patient refused to attend Wellness group at 1600 after encouragement from staff. 1:1 talk time offered as alternative to group session.       Signature:  Yvon Snow RN

## 2021-04-04 NOTE — PLAN OF CARE
Problem: Altered Mood, Depressive Behavior:  Goal: Able to verbalize and/or display a decrease in depressive symptoms  Description: Able to verbalize and/or display a decrease in depressive symptoms  Outcome: Ongoing   Patient admits to depression and anxiety, but states she is feeling a little better. Patient is medication compliant and has been out for needs only this am.  Problem: Altered Mood, Depressive Behavior:  Goal: Absence of self-harm  Description: Absence of self-harm  Outcome: Ongoing   Patient denies Suicidal Ideations and agrees to approach staff if having thoughts to harm self. Q 15 min safety checks continue at this time. Problem: Substance Abuse:  Goal: Absence of drug withdrawal signs and symptoms  Description: Absence of drug withdrawal signs and symptoms  Outcome: Ongoing   Patient denies Withdrawal symptoms this am.  Problem: Pain:  Goal: Pain level will decrease  Description: Pain level will decrease  Outcome: Ongoing     Problem: Pain:  Goal: Control of acute pain  Description: Control of acute pain  Outcome: Ongoing     Problem: Pain:  Goal: Control of chronic pain  Description: Control of chronic pain  Outcome: Ongoing   Patient denies any pain at this time.

## 2021-04-04 NOTE — PLAN OF CARE
Patient was able to verbalize a decrease in depressive symptoms. Patient remains free from self harm.

## 2021-04-04 NOTE — PROGRESS NOTES
Daily Progress Note  4/4/2021  CHIEF COMPLAINT: Suicidal ideation and polysubstance use    Reviewed patient's current plan of care and vital signs with nursing staff. Sleep: Poor, few hours previous evening  Attending groups: Yes patient last used    SUBJECTIVE:    Patient has been compliant with her medications. She has been unable to take the clonidine due to not meeting the blood pressure parameters. She is utilizing Flexeril, trazodone and Atarax for muscle pains anxiety and sleep. Staff report no overnight concerns. Interviewed patient in her room. Set up and spoke to this writer upon entering. She endorses anxiety, denies depression and suicidal ideation, contracts for safety while in unit. Patient was tearful and slightly irritable today. Denies auditory visual hallucinations. Reports that she slept 6 hours last night, and her appetite is improving. She believes that her mother lied to her about her children and she wants to return to [facility]. We discussed Vivitrol today, reviewed risk, benefit and alternative treatments. 6 days ago, currently denies any opiate withdrawal symptoms. Patient was hesitant and did not want to have Vivitrol, we discussed that her risk for relapse is higher without the use of a medication to assist with opiate cravings. We discussed that she has been linked with [facility] for MAT treatment, that she can discuss with them a plan going forward if she would like Suboxone or Vivitrol in the future. Mental Status Exam  Level of consciousness: Alert and awake  Appearance: Hospital attire, lying in bed, fair grooming and hygiene  Behavior/Motor: Tearful, no psychomotor abnormality  Attitude toward examiner: Slightly irritable, but cooperative. Fair eye contact  Speech: Delayed responses, slow rate, low volume, well articulated  Mood:  \"Anxious\"  Affect: Appears dysphoric  Thought processes:  linear, goal directed and coherent  Thought content:  denies homicidal ideation  Suicidal Ideation: Improving suicidal ideation, able to contract for safety on unit  Delusions:  no evidence of delusions  Perceptual Disturbance:  denies any perceptual disturbance  Cognition:  Oriented to self, location, time, and situation  Memory: age appropriate  Insight & Judgement: Poor  Medication side effects:  denies       Data   height is 5' 4\" (1.626 m) and weight is 130 lb (59 kg). Her oral temperature is 97.1 °F (36.2 °C). Her blood pressure is 92/57 (abnormal) and her pulse is 78. Her respiration is 14 and oxygen saturation is 100%.    Labs:   Admission on 03/31/2021   Component Date Value Ref Range Status    WBC 03/31/2021 8.1  3.5 - 11.0 k/uL Final    RBC 03/31/2021 4.57  4.0 - 5.2 m/uL Final    Hemoglobin 03/31/2021 12.4  12.0 - 16.0 g/dL Final    Hematocrit 03/31/2021 37.4  36 - 46 % Final    MCV 03/31/2021 81.9  80 - 100 fL Final    MCH 03/31/2021 27.2  26 - 34 pg Final    MCHC 03/31/2021 33.2  31 - 37 g/dL Final    RDW 03/31/2021 14.7  11.5 - 14.9 % Final    Platelets 18/14/6887 503* 150 - 450 k/uL Final    MPV 03/31/2021 7.7  6.0 - 12.0 fL Final    NRBC Automated 03/31/2021 NOT REPORTED  per 100 WBC Final    Differential Type 03/31/2021 NOT REPORTED   Final    Seg Neutrophils 03/31/2021 46  36 - 66 % Final    Lymphocytes 03/31/2021 41  24 - 44 % Final    Monocytes 03/31/2021 9* 1 - 7 % Final    Eosinophils % 03/31/2021 3  0 - 4 % Final    Basophils 03/31/2021 1  0 - 2 % Final    Immature Granulocytes 03/31/2021 NOT REPORTED  0 % Final    Segs Absolute 03/31/2021 3.80  1.3 - 9.1 k/uL Final    Absolute Lymph # 03/31/2021 3.30  1.0 - 4.8 k/uL Final    Absolute Mono # 03/31/2021 0.70  0.1 - 1.3 k/uL Final    Absolute Eos # 03/31/2021 0.30  0.0 - 0.4 k/uL Final    Basophils Absolute 03/31/2021 0.00  0.0 - 0.2 k/uL Final    Absolute Immature Granulocyte 03/31/2021 NOT REPORTED  0.00 - 0.30 k/uL Final    WBC Morphology 03/31/2021 NOT REPORTED   Final    RBC Morphology 03/31/2021 NOT REPORTED   Final    Platelet Estimate 08/21/9869 NOT REPORTED   Final    Glucose 03/31/2021 98  70 - 99 mg/dL Final    BUN 03/31/2021 14  6 - 20 mg/dL Final    CREATININE 03/31/2021 0.76  0.50 - 0.90 mg/dL Final    Bun/Cre Ratio 03/31/2021 NOT REPORTED  9 - 20 Final    Calcium 03/31/2021 8.9  8.6 - 10.4 mg/dL Final    Sodium 03/31/2021 132* 135 - 144 mmol/L Final    Potassium 03/31/2021 3.7  3.7 - 5.3 mmol/L Final    Chloride 03/31/2021 97* 98 - 107 mmol/L Final    CO2 03/31/2021 27  20 - 31 mmol/L Final    Anion Gap 03/31/2021 8* 9 - 17 mmol/L Final    Alkaline Phosphatase 03/31/2021 89  35 - 104 U/L Final    ALT 03/31/2021 10  5 - 33 U/L Final    AST 03/31/2021 18  <32 U/L Final    Total Bilirubin 03/31/2021 0.26* 0.3 - 1.2 mg/dL Final    Total Protein 03/31/2021 8.1  6.4 - 8.3 g/dL Final    Albumin 03/31/2021 3.9  3.5 - 5.2 g/dL Final    Albumin/Globulin Ratio 03/31/2021 NOT REPORTED  1.0 - 2.5 Final    GFR Non- 03/31/2021 >60  >60 mL/min Final    GFR  03/31/2021 >60  >60 mL/min Final    GFR Comment 03/31/2021        Final    Comment: Average GFR for 30-36 years old:   80 mL/min/1.73sq m  Chronic Kidney Disease:   <60 mL/min/1.73sq m  Kidney failure:   <15 mL/min/1.73sq m              eGFR calculated using average adult body mass. Additional eGFR calculator available at:        [employer]            GFR Staging 03/31/2021 NOT REPORTED   Final    hCG Qual 03/31/2021 NEGATIVE  NEGATIVE Final    Comment: Specimens with hCG levels near the threshold of the test (25 mIU/mL) may give a negative or   indeterminate result. In such cases, another test should be performed with a new specimen   in 48-72 hours. If early pregnancy is suspected clinically in this setting, correlation   with quantitative serum b-hCG level is suggested.       Ethanol 03/31/2021 <10  <10 mg/dL Final    Ethanol percent 03/31/2021 <0.010  % Final    Acetaminophen Level 03/31/2021 <5* 10 - 30 ug/mL Final    Salicylate Lvl 26/98/4841 <1* 3 - 10 mg/dL Final    Specimen Description 03/31/2021 . NASOPHARYNGEAL SWAB   Final    SARS-CoV-2, Rapid 03/31/2021 Not Detected  Not Detected Final    Comment:       Rapid NAAT:  The specimen is NEGATIVE for SARS-CoV-2, the novel coronavirus associated with   COVID-19. The ID NOW COVID-19 assay is designed to detect the virus that causes COVID-19 in patients   with signs and symptoms of infection who are suspected of COVID-19. An individual without symptoms of COVID-19 and who is not shedding SARS-CoV-2 virus would   expect to have a negative (not detected) result in this assay. Negative results should be treated as presumptive and, if inconsistent with clinical signs   and symptoms or necessary for patient management,  should be tested with an alternative molecular assay. Negative results do not preclude   SARS-CoV-2 infection and   should not be used as the sole basis for patient management decisions.          Fact sheet for Healthcare Providers: Ainsley.myles  Fact sheet for Patients: Ainsley.es          Methodology: Isothermal Nucleic Acid Amplification      Amphetamine Screen, Ur 03/31/2021 POSITIVE* NEGATIVE Final    Comment:       (Positive cutoff 1000 ng/mL)                  Barbiturate Screen, Ur 03/31/2021 NEGATIVE  NEGATIVE Final    Comment:       (Positive cutoff 200 ng/mL)                  Benzodiazepine Screen, Urine 03/31/2021 NEGATIVE  NEGATIVE Final    Comment:       (Positive cutoff 200 ng/mL)                  Cocaine Metabolite, Urine 03/31/2021 NEGATIVE  NEGATIVE Final    Comment:       (Positive cutoff 300 ng/mL)                  Methadone Screen, Urine 03/31/2021 NEGATIVE  NEGATIVE Final    Comment:       (Positive cutoff 300 ng/mL)                  Opiates, Urine 03/31/2021 POSITIVE* NEGATIVE Final Comment:       (Positive cutoff 300 ng/mL)                  Phencyclidine, Urine 03/31/2021 NEGATIVE  NEGATIVE Final    Comment:       (Positive cutoff 25 ng/mL)                  Propoxyphene, Urine 03/31/2021 NOT REPORTED  NEGATIVE Final    Cannabinoid Scrn, Ur 03/31/2021 NEGATIVE  NEGATIVE Final    Comment:       (Positive cutoff 50 ng/mL)                  Oxycodone Screen, Ur 03/31/2021 NEGATIVE  NEGATIVE Final    Comment:       (Positive cutoff 100 ng/mL)                  Methamphetamine, Urine 03/31/2021 NOT REPORTED  NEGATIVE Final    Tricyclic Antidepressants, Urine 03/31/2021 NOT REPORTED  NEGATIVE Final    MDMA, Urine 03/31/2021 NOT REPORTED  NEGATIVE Final    Buprenorphine Urine 03/31/2021 NOT REPORTED  NEGATIVE Final    Test Information 03/31/2021 Assay provides medical screening only. The absence of expected drug(s) and/or metabolite(s) may indicate diluted or adulterated urine, limitations of testing or timing of collection. Final    Comment: Testing for legal purposes should be confirmed by another method. To request confirmation   of test result, please call the lab within 7 days of sample submission.  Fentanyl and Metabolites, Urine 03/31/2021 469.1  ng/mL Final    Comment: (NOTE)  INTERPRETIVE INFORMATION: Fentanyl and Metabolite, Urine  Methodology: Quantitative Liquid Chromatography-Tandem Mass   Spectrometry  Positive cutoff: 1.0 ng/mL  For medical purposes only; not valid for forensic use. The absence of expected drug(s) and/or drug metabolite(s) may   indicate non-compliance, inappropriate timing of specimen   collection relative to drug administration, poor drug absorption,   diluted/adulterated urine, or limitations of testing. The   concentration value must be greater than or equal to the cutoff to   be reported as positive. Interpretive questions should be directed   to the laboratory.   This test was developed and its performance characteristics   determined by [facility]. It has not been cleared or   approved by the [employer] and Drug Administration. This test was   performed in a CLIA certified laboratory and is intended for   clinical purposes.  Norfentanyl, Urine 03/31/2021 >1000.0  ng/mL Final    Comment: (NOTE)  Performed By: [address]  [place], [facility]  : Moraima Guillory. Velma Lainez MD      Color, UA 03/31/2021 DARK YELLOW* YELLOW Final    Turbidity UA 03/31/2021 CLEAR  CLEAR Final    Glucose, Ur 03/31/2021 NEGATIVE  NEGATIVE Final    Bilirubin Urine 03/31/2021 NEGATIVE  NEGATIVE Final    Ketones, Urine 03/31/2021 NEGATIVE  NEGATIVE Final    Specific [place], UA 03/31/2021 1.029  1.000 - 1.030 Final    Urine Hgb 03/31/2021 NEGATIVE  NEGATIVE Final    pH, UA 03/31/2021 6.0  5.0 - 8.0 Final    Protein, UA 03/31/2021 TRACE* NEGATIVE Final    Urobilinogen, Urine 03/31/2021 Normal  Normal Final    Nitrite, Urine 03/31/2021 NEGATIVE  NEGATIVE Final    Leukocyte Esterase, Urine 03/31/2021 NEGATIVE  NEGATIVE Final    Urinalysis Comments 03/31/2021 NOT REPORTED   Final    TSH 04/01/2021 0.32  0.30 - 5.00 mIU/L Final    HIV Ag/Ab 04/01/2021 NONREACTIVE  NONREACTIVE Final    Comment: No laboratory evidence of HIV infection. If acute HIV infection is suspected, consider   testing for HIV-1 RNA.  T. pallidum, IgG 04/01/2021 NONREACTIVE  NONREACTIVE Final    Comment:       T. pallidum antibodies are not detected. There is no serological evidence of infection with T. pallidum (early primary syphilis   cannot be excluded). Retest in 2-4 weeks if syphilis is clinically suspect.             - 03/31/2021        Final    WBC, UA 03/31/2021 2 TO 5  /HPF Final    RBC, UA 03/31/2021 2 TO 5  /HPF Final    Casts UA 03/31/2021 NOT REPORTED  /LPF Final    Crystals, UA 03/31/2021 NOT REPORTED  None /HPF Final    Epithelial Cells UA 03/31/2021 5 TO 10  /HPF Final    Renal Epithelial, UA 03/31/2021 NOT REPORTED  0 /HPF Final    Bacteria, UA 03/31/2021 MANY* None Final    Mucus, UA 03/31/2021 3+* None Final    Trichomonas, UA 03/31/2021 NOT REPORTED  None Final    Amorphous, UA 03/31/2021 1+* None Final    Other Observations UA 03/31/2021 NOT REPORTED  NOT REQ. Final    Yeast, UA 03/31/2021 NOT REPORTED  None Final    Glucose 04/01/2021 93  70 - 99 mg/dL Final    BUN 04/01/2021 19  6 - 20 mg/dL Final    CREATININE 04/01/2021 0.67  0.50 - 0.90 mg/dL Final    Bun/Cre Ratio 04/01/2021 NOT REPORTED  9 - 20 Final    Calcium 04/01/2021 9.0  8.6 - 10.4 mg/dL Final    Sodium 04/01/2021 136  135 - 144 mmol/L Final    Potassium 04/01/2021 3.8  3.7 - 5.3 mmol/L Final    SPECIMEN MODERATELY HEMOLYZED, RESULTS MAY BE ADVERSELY AFFECTED    Chloride 04/01/2021 101  98 - 107 mmol/L Final    CO2 04/01/2021 24  20 - 31 mmol/L Final    Anion Gap 04/01/2021 11  9 - 17 mmol/L Final    GFR Non- 04/01/2021 >60  >60 mL/min Final    GFR  04/01/2021 >60  >60 mL/min Final    GFR Comment 04/01/2021        Final    Comment: Average GFR for 30-36 years old:   80 mL/min/1.73sq m  Chronic Kidney Disease:   <60 mL/min/1.73sq m  Kidney failure:   <15 mL/min/1.73sq m              eGFR calculated using average adult body mass.  Additional eGFR calculator available at:        [employer]            GFR Staging 04/01/2021 NOT REPORTED   Final            Medications  Current Facility-Administered Medications: risperiDONE (RISPERDAL) tablet 1 mg, 1 mg, Oral, BID  acetaminophen (TYLENOL) tablet 650 mg, 650 mg, Oral, Q4H PRN  aluminum & magnesium hydroxide-simethicone (MAALOX) 200-200-20 MG/5ML suspension 30 mL, 30 mL, Oral, Q6H PRN  hydrOXYzine (ATARAX) tablet 50 mg, 50 mg, Oral, TID PRN  ibuprofen (ADVIL;MOTRIN) tablet 400 mg, 400 mg, Oral, Q6H PRN  nicotine polacrilex (NICORETTE) gum 2 mg, 2 mg, Oral, PRN  polyethylene glycol (GLYCOLAX) packet 17 g, 17 g, Oral, Daily PRN  traZODone (DESYREL) tablet 50 mg, 50 mg, Oral, Nightly PRN  dicyclomine (BENTYL) capsule 10 mg, 10 mg, Oral, TID PRN  loperamide (IMODIUM) capsule 2 mg, 2 mg, Oral, 4x Daily PRN  ondansetron (ZOFRAN) tablet 4 mg, 4 mg, Oral, Q8H PRN  cyclobenzaprine (FLEXERIL) tablet 10 mg, 10 mg, Oral, TID PRN    ASSESSMENT  Bipolar 1 disorder, depressed (HCC)     PLAN  Patient symptoms show no improvement  Discussed Vivitrol today, patient has refused. Attempt to develop insight  Psycho-education conducted. Supportive Therapy conducted.   Probable discharge is per attending MD  Follow-up daily while inpatient

## 2021-04-05 VITALS
DIASTOLIC BLOOD PRESSURE: 71 MMHG | BODY MASS INDEX: 22.2 KG/M2 | HEIGHT: 64 IN | HEART RATE: 113 BPM | OXYGEN SATURATION: 100 % | TEMPERATURE: 97.8 F | RESPIRATION RATE: 16 BRPM | WEIGHT: 130 LBS | SYSTOLIC BLOOD PRESSURE: 126 MMHG

## 2021-04-05 PROCEDURE — 6370000000 HC RX 637 (ALT 250 FOR IP): Performed by: NURSE PRACTITIONER

## 2021-04-05 PROCEDURE — 99239 HOSP IP/OBS DSCHRG MGMT >30: CPT | Performed by: PSYCHIATRY & NEUROLOGY

## 2021-04-05 PROCEDURE — 6370000000 HC RX 637 (ALT 250 FOR IP): Performed by: PSYCHIATRY & NEUROLOGY

## 2021-04-05 RX ORDER — LORAZEPAM 1 MG/1
1 TABLET ORAL ONCE
Status: COMPLETED | OUTPATIENT
Start: 2021-04-05 | End: 2021-04-05

## 2021-04-05 RX ORDER — TRAZODONE HYDROCHLORIDE 50 MG/1
50 TABLET ORAL NIGHTLY PRN
Qty: 30 TABLET | Refills: 0 | Status: SHIPPED | OUTPATIENT
Start: 2021-04-05

## 2021-04-05 RX ORDER — NALTREXONE HYDROCHLORIDE 50 MG/1
50 TABLET, FILM COATED ORAL
Status: DISCONTINUED | OUTPATIENT
Start: 2021-04-05 | End: 2021-04-05 | Stop reason: HOSPADM

## 2021-04-05 RX ORDER — HYDROXYZINE 50 MG/1
50 TABLET, FILM COATED ORAL 3 TIMES DAILY PRN
Qty: 30 TABLET | Refills: 0 | Status: SHIPPED | OUTPATIENT
Start: 2021-04-05 | End: 2021-04-15

## 2021-04-05 RX ORDER — RISPERIDONE 1 MG/1
1 TABLET, FILM COATED ORAL 2 TIMES DAILY
Qty: 60 TABLET | Refills: 3 | Status: SHIPPED | OUTPATIENT
Start: 2021-04-05

## 2021-04-05 RX ORDER — CLONIDINE HYDROCHLORIDE 0.1 MG/1
0.1 TABLET ORAL ONCE
Status: COMPLETED | OUTPATIENT
Start: 2021-04-05 | End: 2021-04-05

## 2021-04-05 RX ADMIN — LORAZEPAM 1 MG: 1 TABLET ORAL at 13:02

## 2021-04-05 RX ADMIN — ONDANSETRON HYDROCHLORIDE 4 MG: 4 TABLET, FILM COATED ORAL at 13:02

## 2021-04-05 RX ADMIN — RISPERIDONE 1 MG: 1 TABLET ORAL at 08:25

## 2021-04-05 RX ADMIN — NALTREXONE HYDROCHLORIDE 50 MG: 50 TABLET, FILM COATED ORAL at 10:08

## 2021-04-05 RX ADMIN — CLONIDINE HYDROCHLORIDE 0.1 MG: 0.1 TABLET ORAL at 13:02

## 2021-04-05 NOTE — PLAN OF CARE
Problem: Altered Mood, Depressive Behavior:  Goal: Able to verbalize and/or display a decrease in depressive symptoms  Description: Able to verbalize and/or display a decrease in depressive symptoms  4/5/2021 1231 by Mary Miller  Outcome: Completed  Patient denies depression and does not present with depressive symptoms or behaviors. She reports readiness for discharge. Q15min checks for safety. Problem: Altered Mood, Depressive Behavior:  Goal: Absence of self-harm  Description: Absence of self-harm  4/5/2021 1231 by Mary Miller  Outcome: Completed   Safe environment maintained and patient remains free from self-harm. Agreeable to seeking staff should thoughts to harm self or others arise. Q15min checks for safety. Problem: Substance Abuse:  Goal: Absence of drug withdrawal signs and symptoms  Description: Absence of drug withdrawal signs and symptoms  4/5/2021 1231 by Mary Miller  Outcome: Completed  Patient denies withdrawal s/s and reports looking forward to attending 12 step mtgs to help them maintain sobriety.

## 2021-04-05 NOTE — BH NOTE
Patient given tobacco quitline number 02366288108 at this time, refusing to call at this time, states \" I just dont want to quit now\"- patient given information as to the dangers of long term tobacco use. Continue to reinforce the importance of tobacco cessation.

## 2021-04-05 NOTE — CARE COORDINATION
SW spoke with pt about discharge planning. Pt stated \"I am going back to [place] when released from here. It's a 36 hour drive. \" Pt declined SI, HI or Hallucinations. Pt gave this SW verbal to call ex-mother-in-law Roxanna to verify pt is allowed to stay there. Upon making the call, Roxanna stated \"No, she is not welcome here. I have custody of the kids per the divorce papers and my sons last will and testament. I wish her good luck but she can't come here. \"

## 2021-04-05 NOTE — SUICIDE SAFETY PLAN
SAFETY PLAN    A suicide Safety Plan is a document that supports someone when they are having thoughts of suicide. Warning Signs that indicate a suicidal crisis may be developing: What (situations, thoughts, feelings, body sensations, behaviors, etc.) do you experience that lets you know you are beginning to think about suicide? 1. \"I don't think about suicide\"        Internal Coping Strategies:  What things can I do (relaxation techniques, hobbies, physical activities, etc.) to take my mind off my problems without contacting another person? 1. Go for walk  2. Read   3. Color    People and social settings that provide distraction: Who can I call or where can I go to distract me? 1. A park  2. Go to a store       People whom I can ask for help: Who can I call when I need help - for example, friends, family, clergy, someone else? 1. Hospital  2. brother  3. Grandma    Professionals or [facility] agencies I can contact during a crisis: Who can I call for help - for example, my doctor, my psychiatrist, my psychologist, a mental health provider, a suicide hotline? [facility] [address], [address] [place] [address]. [id number]    [phone]    2. Suicide Prevention Lifeline: 8-050-155-ALSO (8605)    3. Rescue Crisis: [address] [facility]. [address] [phone]    Making the environment safe: How can I make my environment (house/apartment/living space) safer? For example, can I remove guns, medications, and other items? 1. Stay positive  2.  Avoid using drugs

## 2021-04-05 NOTE — GROUP NOTE
Group Therapy Note    Date: 4/5/2021    Group Start Time: 2072  Group End Time: 6321  Group Topic: Psychoeducation    [facility]    Patient refused to attend music for coping group at 1325 after encouragement from staff. 1:1 talk time offered by staff as alternative to group session.

## 2021-04-05 NOTE — PLAN OF CARE
Problem: Altered Mood, Depressive Behavior:  Goal: Able to verbalize and/or display a decrease in depressive symptoms  Description: Able to verbalize and/or display a decrease in depressive symptoms  Outcome: Ongoing  Patient denies depression and does not present with depressive symptoms or behaviors. She reports readiness for discharge. Q15min checks for safety. Problem: Altered Mood, Depressive Behavior:  Goal: Absence of self-harm  Description: Absence of self-harm  Outcome: Ongoing  Safe environment maintained and patient remains free from self-harm. Agreeable to seeking staff should thoughts to harm self or others arise. Q15min checks for safety. Problem: Substance Abuse:  Goal: Absence of drug withdrawal signs and symptoms  Description: Absence of drug withdrawal signs and symptoms  Outcome: Ongoing   Patient denies withdrawal s/s and reports looking forward to attending 12 step mtgs to help them maintain sobriety.

## 2021-04-05 NOTE — GROUP NOTE
Group Therapy Note    Date: 4/5/2021    Group Start Time: 0900  Group End Time: 7313  Group Topic: Community Meeting    [address] [place]        Group Therapy Note    Manjinder Majano    patient refused to attend  community meeting and goal setting group at [phone] after encouragement from staff. 1:1 talk time provided as alternative to group session.         Signature:  Pancho Carroll

## 2021-04-05 NOTE — PROGRESS NOTES
CLINICAL PHARMACY NOTE: MEDS TO [address] Select Patient?: No  Total # of Prescriptions Filled: 3   The following medications were delivered to the patient:  · Hydroxyzine  · Trazodone  · Risperidone  ·   Total # of Interventions Completed: 0  Time Spent (min): 30    Additional Documentation:

## 2021-04-05 NOTE — BH NOTE
[facility]  Discharge Note    Pt discharged with followings belongings:   Dentures: None  Vision - Corrective Lenses: None  Hearing Aid: None  Jewelry: Ring, Necklace, Bracelet  Body Piercings Removed: N/A  Clothing: Footwear, Pants, Jacket / coat, Shirt  Were All Patient Medications Collected?: Not Applicable  Other Valuables: None   Valuables sent home with patient. Valuables retrieved from safe, Security envelope number: [id number] and returned to patient. Patient education on aftercare instructions. [facility] to [facility] by RN. Patient verbalize understanding of AVS.    Status EXAM upon discharge:  Status and Exam  Normal: No  Facial Expression: Flat  Affect: Blunt, Constricted  Level of Consciousness: Alert  Mood:Normal: No  Mood: Anxious  Motor Activity:Normal: Yes  Motor Activity: Decreased  Interview Behavior: Cooperative  Preception: [place] to Person, [place] to Time, [place] to Place, [place] to Situation  Attention:Normal: Yes  Attention: Unable to Concentrate  Thought Processes: Circumstantial  Thought Content:Normal: No  Thought Content: Preoccupations  Hallucinations: None  Delusions: No  Memory:Normal: Yes  Memory: Poor Recent, Poor Remote  Insight and Judgment: No  Insight and Judgment: Poor Judgment, Poor Insight  Present Suicidal Ideation: No  Present Homicidal Ideation: No      Metabolic Screening:    No results found for: LABA1C    No results found for: CHOL  No results found for: TRIG  No results found for: HDL  No components found for: LDLCAL  No results found for: Megan Story RN      Patient discharged home via her own car in the parking lot.

## 2021-04-06 NOTE — CARE COORDINATION
Name: River Austin    : 1980    Discharge Date: 2021    Primary Auth/Cert #: [id number]    Destination: home     Discharge Medications:      Medication List      START taking these medications    hydrOXYzine 50 MG tablet  Commonly known as: ATARAX  Take 1 tablet by mouth 3 times daily as needed for Anxiety  Notes to patient: For anxiety     risperiDONE 1 MG tablet  Commonly known as: RISPERDAL  Take 1 tablet by mouth 2 times daily  Notes to patient: To help clear thiinking     traZODone 50 MG tablet  Commonly known as: DESYREL  Take 1 tablet by mouth nightly as needed for Sleep  Notes to patient: For sleep           Where to Get Your Medications      These medications were sent to [facility], [address], [address] [address]    Phone: [phone]   · hydrOXYzine 50 MG tablet  · risperiDONE 1 MG tablet  · traZODone 50 MG tablet         Follow Up Appointment: [facility] team  [address] [facility] [address]  On 2021  This is a phone call to [address] [place] [address]    [place] [address]  [place] [address]  [phone]    On 2021  Has in person appointment at 10:30 am for intake

## 2021-09-20 NOTE — ED PROVIDER NOTES
exacerbation of chronic mental illness opioid abuse withdrawal.  Patient comes in requesting detoxification from heroin. Unfortunately patient does not have [employer] we did have our social work come and speak to the patient she does not meet any inpatient criteria for our behavioral health [place] she was given outpatient resources patient will be discharged         Vitals:    Vitals:    03/31/21 0310   BP: 110/66   Pulse: 105   Resp: 16   Temp: 98 °F (36.7 °C)   TempSrc: Oral   SpO2: 99%   Weight: 130 lb (59 kg)   Height: 5' 4\" (1.626 m)       FINAL IMPRESSION      1.  Heroin addiction [facility])         DISPOSITION/PLAN   DISPOSITION Decision To Discharge 03/31/2021 03:43:11 AM      PATIENT REFERRED TO:  [facility] ED  [address] [facility]    If symptoms worsen  Josselyn Christianson MD  Attending Emergency Physician    This charting supersedes any ED resident or staff charting and was written using speech recognition software       Josselyn Christianson MD  03/31/21 8096 Yes

## 2024-02-05 ENCOUNTER — TELEPHONE (OUTPATIENT)
Dept: GASTROENTEROLOGY | Age: 44
End: 2024-02-05

## 2024-02-05 NOTE — TELEPHONE ENCOUNTER
Writer talked to mom for patient cause she answered the phone said the patient was a at work and was going to tell the patient to call and make and appointment from her referral with dr duarte.

## 2024-03-06 ENCOUNTER — TELEPHONE (OUTPATIENT)
Dept: GASTROENTEROLOGY | Age: 44
End: 2024-03-06

## 2024-03-06 NOTE — TELEPHONE ENCOUNTER
Writer called and left v/m message for patient to call and schedudle an appt from her referral. With dattempt x 1 lvm  attempt z2 letter sentr gerald.

## 2024-03-11 ENCOUNTER — TELEPHONE (OUTPATIENT)
Dept: GASTROENTEROLOGY | Age: 44
End: 2024-03-11

## 2024-03-11 NOTE — TELEPHONE ENCOUNTER
Writer left voice mail for patient to schedule and appointment for her referral with dr duarte.  This is the 3rd attempt and took out of work que.

## 2024-03-11 NOTE — TELEPHONE ENCOUNTER
Pt mother called back advising our office to call the patients number, gave #[phone], thanked writer call ended please adv

## 2024-07-11 ENCOUNTER — TELEPHONE (OUTPATIENT)
Dept: UROLOGY | Age: 44
End: 2024-07-11

## 2024-07-11 NOTE — TELEPHONE ENCOUNTER
We do not take [facility] referrals, referral was faxed over to [facility].

## 2024-08-21 ENCOUNTER — TELEPHONE (OUTPATIENT)
Dept: UROLOGY | Age: 44
End: 2024-08-21

## 2024-10-20 ENCOUNTER — HOSPITAL ENCOUNTER (EMERGENCY)
Age: 44
Discharge: HOME | End: 2024-10-20
Attending: EMERGENCY MEDICINE
Payer: COMMERCIAL

## 2024-10-20 VITALS
RESPIRATION RATE: 18 BRPM | TEMPERATURE: 98.1 F | HEART RATE: 76 BPM | OXYGEN SATURATION: 99 % | WEIGHT: 127 LBS | HEIGHT: 65 IN | SYSTOLIC BLOOD PRESSURE: 119 MMHG | BODY MASS INDEX: 21.16 KG/M2 | DIASTOLIC BLOOD PRESSURE: 71 MMHG

## 2024-10-20 DIAGNOSIS — N30.00 ACUTE CYSTITIS WITHOUT HEMATURIA: Primary | ICD-10-CM

## 2024-10-20 LAB
BACTERIA URNS QL MICRO: ABNORMAL
BILIRUB UR QL STRIP: NEGATIVE
CLARITY UR: ABNORMAL
COLOR UR: YELLOW
EPI CELLS #/AREA URNS HPF: ABNORMAL /HPF (ref 0–5)
GLUCOSE UR STRIP-MCNC: NEGATIVE MG/DL
HCG UR QL: NEGATIVE
HGB UR QL STRIP.AUTO: NEGATIVE
KETONES UR STRIP-MCNC: NEGATIVE MG/DL
LEUKOCYTE ESTERASE UR QL STRIP: ABNORMAL
NITRITE UR QL STRIP: POSITIVE
PH UR STRIP: 6 [PH] (ref 5–8)
PROT UR STRIP-MCNC: NEGATIVE MG/DL
RBC #/AREA URNS HPF: ABNORMAL /HPF (ref 0–2)
SP GR UR STRIP: 1.03 (ref 1–1.03)
UROBILINOGEN UR STRIP-ACNC: NORMAL EU/DL (ref 0–1)
WBC #/AREA URNS HPF: ABNORMAL /HPF (ref 0–5)

## 2024-10-20 PROCEDURE — 6360000002 HC RX W HCPCS

## 2024-10-20 PROCEDURE — 81001 URINALYSIS AUTO W/SCOPE: CPT

## 2024-10-20 PROCEDURE — 87186 SC STD MICRODIL/AGAR DIL: CPT

## 2024-10-20 PROCEDURE — 87086 URINE CULTURE/COLONY COUNT: CPT

## 2024-10-20 PROCEDURE — 87077 CULTURE AEROBIC IDENTIFY: CPT

## 2024-10-20 PROCEDURE — 96372 THER/PROPH/DIAG INJ SC/IM: CPT

## 2024-10-20 PROCEDURE — 99284 EMERGENCY DEPT VISIT MOD MDM: CPT

## 2024-10-20 PROCEDURE — 6370000000 HC RX 637 (ALT 250 FOR IP)

## 2024-10-20 PROCEDURE — 2500000003 HC RX 250 WO HCPCS

## 2024-10-20 PROCEDURE — 81025 URINE PREGNANCY TEST: CPT

## 2024-10-20 RX ORDER — PHENAZOPYRIDINE HYDROCHLORIDE 100 MG/1
200 TABLET, FILM COATED ORAL ONCE
Status: COMPLETED | OUTPATIENT
Start: 2024-10-20 | End: 2024-10-20

## 2024-10-20 RX ORDER — BUPRENORPHINE AND NALOXONE 4; 1 MG/1; MG/1
1 FILM, SOLUBLE BUCCAL; SUBLINGUAL 3 TIMES DAILY
COMMUNITY

## 2024-10-20 RX ORDER — CEFTRIAXONE 1 G/1
1000 INJECTION, POWDER, FOR SOLUTION INTRAMUSCULAR; INTRAVENOUS ONCE
Status: COMPLETED | OUTPATIENT
Start: 2024-10-20 | End: 2024-10-20

## 2024-10-20 RX ORDER — GABAPENTIN 600 MG/1
600 TABLET ORAL 4 TIMES DAILY
COMMUNITY

## 2024-10-20 RX ORDER — LIDOCAINE HYDROCHLORIDE 10 MG/ML
5 INJECTION, SOLUTION INFILTRATION; PERINEURAL ONCE
Status: COMPLETED | OUTPATIENT
Start: 2024-10-20 | End: 2024-10-20

## 2024-10-20 RX ORDER — LIDOCAINE HYDROCHLORIDE 10 MG/ML
INJECTION, SOLUTION EPIDURAL; INFILTRATION; INTRACAUDAL; PERINEURAL
Status: COMPLETED
Start: 2024-10-20 | End: 2024-10-20

## 2024-10-20 RX ADMIN — LIDOCAINE HYDROCHLORIDE 5 ML: 10 INJECTION, SOLUTION EPIDURAL; INFILTRATION; INTRACAUDAL; PERINEURAL at 21:16

## 2024-10-20 RX ADMIN — CEFTRIAXONE SODIUM 1000 MG: 1 INJECTION, POWDER, FOR SOLUTION INTRAMUSCULAR; INTRAVENOUS at 21:16

## 2024-10-20 RX ADMIN — LIDOCAINE HYDROCHLORIDE 5 ML: 10 INJECTION, SOLUTION INFILTRATION; PERINEURAL at 21:16

## 2024-10-20 RX ADMIN — PHENAZOPYRIDINE HYDROCHLORIDE 200 MG: 100 TABLET ORAL at 20:50

## 2024-10-20 ASSESSMENT — PAIN SCALES - GENERAL
PAINLEVEL_OUTOF10: 4
PAINLEVEL_OUTOF10: 4

## 2024-10-20 ASSESSMENT — PAIN DESCRIPTION - LOCATION: LOCATION: ABDOMEN

## 2024-10-20 ASSESSMENT — PAIN - FUNCTIONAL ASSESSMENT: PAIN_FUNCTIONAL_ASSESSMENT: 0-10

## 2024-10-21 NOTE — ED PROVIDER NOTES
[facility] EMERGENCY DEPARTMENT  Emergency Department Encounter  Mid Level Provider     Pt Name: Tiffany Morales  MRN: [id number]  Birthdate 1980  Date of evaluation: 10/20/24  PCP:  No primary care provider on file.    CHIEF COMPLAINT       Chief Complaint   Patient presents with    UTI       HISTORY OF PRESENT ILLNESS  (Location/Symptom, Timing/Onset,Context/Setting, Quality, Duration, Modifying Factors, Severity.)      Tiffany Morales is a 44 y.o. female who presents with complaints of dysuria for the past 4 days.  She also notes that she has had increased urinary urgency and frequency today.  She reports a history of recurrent UTIs that has been ongoing over the past 1 to 2 years.  She reports she has UTI symptoms every month to 2 months.  She has been seen at multiple facilities and has been on various antibiotics over the past year.  Her last urinary tract infection was in August of this year she was initially placed on Bactrim but received a phone call later that the urine culture was sensitive only to Macrobid and they placed her on oral Macrobid.  I reviewed this urinalysis culture and it was susceptible to both Macrobid and ceftriaxone.  She reports her symptoms did improve after the Macrobid however they are back over the last 4 days and are more significant than they have been in the past.  She has never seen a urologist in the past but would like referral to 1 today.     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     has a past surgical history that includes Tubal ligation.    Social History     Socioeconomic History    Marital status:      Spouse name: Not on file    Number of children: Not on file    Years of education: Not on file    Highest education level: Not on file   Occupational History

## 2024-10-21 NOTE — ED PROVIDER NOTES
[facility] EMERGENCY DEPARTMENT  eMERGENCY dEPARTMENT eNCOUnter   Independent Attestation     Pt Name: Tiffany Morales  MRN: [id number]  Birthdate 1980  Date of evaluation: 10/20/24       Tiffany Morales is a 44 y.o. female who presents with UTI        Based on the medical record, the care appears appropriate. I was personally available for consultation in the Emergency Department.    Pawan Vidales MD  Attending Emergency  Physician                Pawan Vidales MD  10/20/24 2052

## 2024-10-23 LAB
MICROORGANISM SPEC CULT: ABNORMAL
SERVICE CMNT-IMP: ABNORMAL
SPECIMEN DESCRIPTION: ABNORMAL

## 2025-04-18 ENCOUNTER — HOSPITAL ENCOUNTER (EMERGENCY)
Age: 45
Discharge: HOME | End: 2025-04-18
Attending: STUDENT IN AN ORGANIZED HEALTH CARE EDUCATION/TRAINING PROGRAM
Payer: COMMERCIAL

## 2025-04-18 VITALS
DIASTOLIC BLOOD PRESSURE: 64 MMHG | WEIGHT: 130 LBS | BODY MASS INDEX: 21.66 KG/M2 | SYSTOLIC BLOOD PRESSURE: 113 MMHG | OXYGEN SATURATION: 98 % | HEART RATE: 66 BPM | TEMPERATURE: 98.1 F | RESPIRATION RATE: 18 BRPM | HEIGHT: 65 IN

## 2025-04-18 DIAGNOSIS — N30.00 ACUTE CYSTITIS WITHOUT HEMATURIA: Primary | ICD-10-CM

## 2025-04-18 LAB
BACTERIA URNS QL MICRO: ABNORMAL
BILIRUB UR QL STRIP: NEGATIVE
CASTS #/AREA URNS LPF: ABNORMAL /LPF
CLARITY UR: CLEAR
COLOR UR: YELLOW
EPI CELLS #/AREA URNS HPF: ABNORMAL /HPF
GLUCOSE UR STRIP-MCNC: NEGATIVE MG/DL
HCG UR QL: NEGATIVE
HGB UR QL STRIP.AUTO: NEGATIVE
KETONES UR STRIP-MCNC: NEGATIVE MG/DL
LEUKOCYTE ESTERASE UR QL STRIP: ABNORMAL
NITRITE UR QL STRIP: POSITIVE
PH UR STRIP: 6.5 [PH] (ref 5–8)
PROT UR STRIP-MCNC: NEGATIVE MG/DL
RBC #/AREA URNS HPF: ABNORMAL /HPF
SP GR UR STRIP: 1.01 (ref 1–1.03)
UROBILINOGEN UR STRIP-ACNC: NORMAL EU/DL (ref 0–1)
WBC #/AREA URNS HPF: ABNORMAL /HPF

## 2025-04-18 PROCEDURE — 81001 URINALYSIS AUTO W/SCOPE: CPT

## 2025-04-18 PROCEDURE — 99283 EMERGENCY DEPT VISIT LOW MDM: CPT

## 2025-04-18 PROCEDURE — 87086 URINE CULTURE/COLONY COUNT: CPT

## 2025-04-18 PROCEDURE — 81025 URINE PREGNANCY TEST: CPT

## 2025-04-18 PROCEDURE — 87077 CULTURE AEROBIC IDENTIFY: CPT

## 2025-04-18 RX ORDER — PHENAZOPYRIDINE HYDROCHLORIDE 200 MG/1
200 TABLET, FILM COATED ORAL 3 TIMES DAILY PRN
Qty: 6 TABLET | Refills: 0 | Status: SHIPPED | OUTPATIENT
Start: 2025-04-18 | End: 2025-04-21

## 2025-04-18 RX ORDER — NITROFURANTOIN 25; 75 MG/1; MG/1
100 CAPSULE ORAL 2 TIMES DAILY
Qty: 14 CAPSULE | Refills: 0 | Status: SHIPPED | OUTPATIENT
Start: 2025-04-18 | End: 2025-04-25

## 2025-04-18 ASSESSMENT — PAIN - FUNCTIONAL ASSESSMENT: PAIN_FUNCTIONAL_ASSESSMENT: 0-10

## 2025-04-18 ASSESSMENT — LIFESTYLE VARIABLES
HOW MANY STANDARD DRINKS CONTAINING ALCOHOL DO YOU HAVE ON A TYPICAL DAY: PATIENT DOES NOT DRINK
HOW OFTEN DO YOU HAVE A DRINK CONTAINING ALCOHOL: NEVER

## 2025-04-18 ASSESSMENT — PAIN SCALES - GENERAL: PAINLEVEL_OUTOF10: 4

## 2025-04-18 ASSESSMENT — ENCOUNTER SYMPTOMS: VOMITING: 0

## 2025-04-18 NOTE — DISCHARGE INSTRUCTIONS
Avoid caffeine products  Avoid soda pops  Drink more water  Good hygiene  Do not hold urine  Follow-up with your urologist  return if worse

## 2025-04-18 NOTE — ED PROVIDER NOTES
[facility] EMERGENCY DEPARTMENT  eMERGENCY dEPARTMENT eNCOUnter      Pt Name: Tiffany Morales  MRN: [id number]  Birthdate 1980  Date of evaluation: 4/18/25      CHIEF COMPLAINT       Chief Complaint   Patient presents with    Dysuria     Dysuria, odor, and discomfort. Pt states symptoms x 2 days. Pt states she gets frequent utis and has resistance to a lot of antibiotics. Pt states she was recently on Macrobid and that worked for her.         HISTORY OF PRESENT ILLNESS    Tiffany Morales is a 44 y.o. female who presents complaining of frequency of urination.  She has chronic UTIs.  She has been on several antibiotics.  She reports last couple of days she has had some increased frequency.  She states that she was recently on Macrobid that worked well for her.  She sees urology but has not seen them in quite some time.  She also reports drinking quite a bit of caffeinated beverages coffee soda which may contribute to her cystitis.  The history is provided by the patient.   Dysuria   This is a chronic problem. The problem occurs intermittently. The problem has not changed since onset.The quality of the pain is described as burning. The pain is mild. There has been no fever. She is Sexually active. There is No history of pyelonephritis. Pertinent negatives include no chills, no sweats, no vomiting, no discharge, no hematuria, no hesitancy, no urgency and no flank pain. She has tried nothing for the symptoms. Her past medical history is significant for recurrent UTIs.       REVIEW OF SYSTEMS       Review of Systems   Constitutional:  Negative for chills.   Gastrointestinal:  Negative for vomiting.   Genitourinary:  Positive for dysuria. Negative for flank pain, hematuria, hesitancy and urgency.   All other systems reviewed and are negative.      PAST MEDICAL HISTORY     Past Medical History:   Diagnosis Date    Back pain     Bipolar disorder     Depression     Methamphetamine abuse in remission     Opioid use

## 2025-04-19 LAB
MICROORGANISM SPEC CULT: ABNORMAL
SPECIMEN DESCRIPTION: ABNORMAL

## 2025-04-19 NOTE — ED PROVIDER NOTES
eMERGENCY dEPARTMENT eNCOUnter   Independent Attestation     Pt Name: Tiffany Morales  MRN: [id number]  Birthdate 1980  Date of evaluation: 4/18/25     Tiffany Morales is a 44 y.o. female with CC: Dysuria (Dysuria, odor, and discomfort. Pt states symptoms x 2 days. Pt states she gets frequent utis and has resistance to a lot of antibiotics. Pt states she was recently on Macrobid and that worked for her.)      Based on the medical record the care appears appropriate.  I was personally available for consultation in the Emergency Department.      Huy Montalvo DO  Attending Emergency Physician          Huy Montalvo DO  04/18/25 2031

## 2025-04-20 ENCOUNTER — RESULTS FOLLOW-UP (OUTPATIENT)
Dept: EMERGENCY DEPT | Age: 45
End: 2025-04-20

## 2025-04-21 LAB
MICROORGANISM SPEC CULT: ABNORMAL
MICROORGANISM SPEC CULT: ABNORMAL
SPECIMEN DESCRIPTION: ABNORMAL

## 2025-07-27 ENCOUNTER — OFFICE VISIT (OUTPATIENT)
Age: 45
End: 2025-07-27

## 2025-07-27 VITALS
SYSTOLIC BLOOD PRESSURE: 125 MMHG | WEIGHT: 130 LBS | BODY MASS INDEX: 21.63 KG/M2 | DIASTOLIC BLOOD PRESSURE: 76 MMHG | OXYGEN SATURATION: 96 % | HEART RATE: 81 BPM

## 2025-07-27 DIAGNOSIS — N30.00 ACUTE CYSTITIS WITHOUT HEMATURIA: Primary | ICD-10-CM

## 2025-07-27 LAB
BILIRUBIN, POC: ABNORMAL
BLOOD URINE, POC: ABNORMAL
CLARITY, POC: ABNORMAL
COLOR, POC: YELLOW
GLUCOSE URINE, POC: ABNORMAL MG/DL
KETONES, POC: ABNORMAL MG/DL
LEUKOCYTE EST, POC: 2
NITRITE, POC: POSITIVE
PH, POC: 7
PROTEIN, POC: 1 MG/DL
SPECIFIC GRAVITY, POC: 1.01
UROBILINOGEN, POC: ABNORMAL MG/DL

## 2025-07-27 RX ORDER — NITROFURANTOIN 25; 75 MG/1; MG/1
100 CAPSULE ORAL 2 TIMES DAILY
Qty: 14 CAPSULE | Refills: 0 | Status: SHIPPED | OUTPATIENT
Start: 2025-07-27 | End: 2025-08-03

## 2025-07-27 RX ORDER — PHENAZOPYRIDINE HYDROCHLORIDE 200 MG/1
200 TABLET, FILM COATED ORAL 3 TIMES DAILY PRN
Qty: 9 TABLET | Refills: 0 | Status: SHIPPED | OUTPATIENT
Start: 2025-07-27 | End: 2025-07-30

## 2025-07-31 ENCOUNTER — RESULTS FOLLOW-UP (OUTPATIENT)
Age: 45
End: 2025-07-31

## 2025-07-31 RX ORDER — CEFDINIR 300 MG/1
300 CAPSULE ORAL 2 TIMES DAILY
Qty: 14 CAPSULE | Refills: 0 | Status: SHIPPED | OUTPATIENT
Start: 2025-07-31 | End: 2025-08-07

## 2025-08-07 ENCOUNTER — OFFICE VISIT (OUTPATIENT)
Age: 45
End: 2025-08-07

## 2025-08-07 VITALS
BODY MASS INDEX: 22.49 KG/M2 | HEIGHT: 65 IN | WEIGHT: 135 LBS | SYSTOLIC BLOOD PRESSURE: 119 MMHG | TEMPERATURE: 98.2 F | HEART RATE: 100 BPM | RESPIRATION RATE: 16 BRPM | OXYGEN SATURATION: 98 % | DIASTOLIC BLOOD PRESSURE: 70 MMHG

## 2025-08-07 DIAGNOSIS — N76.0 BV (BACTERIAL VAGINOSIS): Primary | ICD-10-CM

## 2025-08-07 DIAGNOSIS — B96.89 BV (BACTERIAL VAGINOSIS): Primary | ICD-10-CM

## 2025-08-07 LAB
BILIRUBIN, POC: NEGATIVE
BLOOD URINE, POC: ABNORMAL
CLARITY, POC: ABNORMAL
COLOR, POC: YELLOW
CONTROL: NORMAL
GLUCOSE URINE, POC: NEGATIVE MG/DL
KETONES, POC: ABNORMAL MG/DL
LEUKOCYTE EST, POC: ABNORMAL
NITRITE, POC: NEGATIVE
PH, POC: 6
PREGNANCY TEST URINE, POC: NEGATIVE
PROTEIN, POC: 30 MG/DL
SPECIFIC GRAVITY, POC: 1.02
UROBILINOGEN, POC: 0.2 MG/DL

## 2025-08-07 RX ORDER — METRONIDAZOLE 500 MG/1
500 TABLET ORAL 2 TIMES DAILY
Qty: 14 TABLET | Refills: 0 | Status: SHIPPED | OUTPATIENT
Start: 2025-08-07 | End: 2025-08-14

## 2025-08-07 ASSESSMENT — ENCOUNTER SYMPTOMS
ALLERGIC/IMMUNOLOGIC NEGATIVE: 1
GASTROINTESTINAL NEGATIVE: 1
RESPIRATORY NEGATIVE: 1
EYES NEGATIVE: 1

## 2025-08-09 ENCOUNTER — RESULTS FOLLOW-UP (OUTPATIENT)
Age: 45
End: 2025-08-09

## 2025-08-09 DIAGNOSIS — N30.00 ACUTE CYSTITIS WITHOUT HEMATURIA: Primary | ICD-10-CM

## 2025-08-09 RX ORDER — AZITHROMYCIN 250 MG/1
TABLET, FILM COATED ORAL
Qty: 6 TABLET | Refills: 0 | Status: SHIPPED | OUTPATIENT
Start: 2025-08-09 | End: 2025-08-19